# Patient Record
Sex: FEMALE | Race: BLACK OR AFRICAN AMERICAN
[De-identification: names, ages, dates, MRNs, and addresses within clinical notes are randomized per-mention and may not be internally consistent; named-entity substitution may affect disease eponyms.]

---

## 2017-05-18 ENCOUNTER — HOSPITAL ENCOUNTER (EMERGENCY)
Dept: HOSPITAL 17 - NEPK | Age: 32
Discharge: HOME | End: 2017-05-18
Payer: MEDICAID

## 2017-05-18 VITALS
DIASTOLIC BLOOD PRESSURE: 82 MMHG | HEART RATE: 98 BPM | RESPIRATION RATE: 20 BRPM | SYSTOLIC BLOOD PRESSURE: 140 MMHG | TEMPERATURE: 98.6 F | OXYGEN SATURATION: 98 %

## 2017-05-18 VITALS — WEIGHT: 112.44 LBS | BODY MASS INDEX: 18.73 KG/M2 | HEIGHT: 65 IN

## 2017-05-18 DIAGNOSIS — Z79.4: ICD-10-CM

## 2017-05-18 DIAGNOSIS — E10.9: Primary | ICD-10-CM

## 2017-05-18 PROCEDURE — 99282 EMERGENCY DEPT VISIT SF MDM: CPT

## 2017-05-18 NOTE — PD
HPI


.


Shaky and jittery feeling intermittently


Chief Complaint:  Diabetic


Time Seen by Provider:  08:53


Travel History


International Travel<30 days:  No


Contact w/Intl Traveler<30days:  No


Traveled to known affect area:  No





History of Present Illness


HPI


31-year-old female with history of type 1 diabetes here with complaints of 

feeling shaky and jittery occasionally.  Patient tells me that yesterday she 

had a shaky and jittery feeling.  Unfortunately she does not have a glucometer 

and she tells me that she was unable to check her blood sugar.  She does take 

insulin daily.  She recently missed a primary care follow-up and says that she 

did not have a ride.  She is in the process of looking for someone a little bit 

closer to home.  Today she denies any fever, chills, shaking, lightheadedness 

or abnormal sensation.





PFSH


Past Medical History


Autoimmune Disease:  No


Blood Disorders:  No


Cardiovascular Problems:  Yes ("RAPID HEART RATE")


Diabetes:  Yes


Diminished Hearing:  No


Endocrine:  No


Genitourinary:  No


Immune Disorder:  No


Musculoskeletal:  No


Neurologic:  No


Reproductive:  No


Respiratory:  No


Pregnant?:  Not Pregnant


LMP:  LAST MONTH


:  5


Para:  4


Miscarriage:  1


:  0


Tubal Ligation:  Yes





Social History


Alcohol Use:  No


Tobacco Use:  No


Substance Use:  No





Allergies-Medications


(Allergen,Severity, Reaction):  


Coded Allergies:  


     No Known Allergies (Verified , 17)


Reported Meds & Prescriptions





Reported Meds & Active Scripts


Active


Novolog (Insulin Aspart) 100 Units/ML Inj 30 Units SQ AM 


Levemir (Insulin Detemir)  Inj 10 Units SC AM 


Contour Next EZ Blood Glucose Meter (Device)  Device 1 Unit XX AS DIRECTED 


Insulin Syringes 1 ml   Box 100 (Syringes Insulin 1 ml   Box 100)  Box 1 Box XX

  


Glucometer Test Strips (Glucomteststrips)  Box 1 Box XX  


Glucometer  Kit 1 Kit XX  


Mycostatin 500,000 Unit/5 Ml Udc (Nystatin) 5 Ml Susp 5 Ml SS QID  12 Days


Reported


Novolog (Insulin Aspart) 100 Units/ML Inj 30 Units SQ HS 








Review of Systems


General / Constitutional:  No: Fever


Eyes:  No: Visual changes


HENT:  No: Headaches


Cardiovascular:  No: Chest Pain or Discomfort


Respiratory:  No: Shortness of Breath


Gastrointestinal:  No: Abdominal Pain


Genitourinary:  No: Dysuria


Musculoskeletal:  No: Pain


Skin:  No Rash


Neurologic:  No: Weakness


Psychiatric:  No: Depression


Endocrine:  No: Polydipsia


Hematologic/Lymphatic:  No: Easy Bruising





Physical Exam


Narrative


GENERAL: AAO x 3, no acute distress, Well-nourished, well-developed patient.


SKIN: Warm and dry. No visible rashes or bruising. 


HEAD: Normocephalic and atraumatic.


EYES: No scleral icterus. No injection or drainage. EOM intact, PERRLA


ENT: No nasal drainage noted. Mucous membranes pink. Airway patent. 


NECK: Supple, trachea midline. No JVD.


CARDIOVASCULAR: Regular rate and rhythm without murmurs, gallops, or rubs. 


RESPIRATORY: Breath sounds equal bilaterally. No accessory muscle use. No 

rhonchi or rales. 


GASTROINTESTINAL: Abdomen soft, non-tender, nondistended. 


EXTREMITIES: No cyanosis or edema. 


BACK: Nontender without obvious deformity. No CVA tenderness.


NEURO: CN II through XII grossly intact


PSYCH: AAO x 3, normal affect.





Data


Data


Last Documented VS





Vital Signs








  Date Time  Temp Pulse Resp B/P Pulse Ox O2 Delivery O2 Flow Rate FiO2


 


17 08:28 98.6 98 20 140/82 98   











MDM


Medical Decision Making


Medical Screen Exam Complete:  Yes


Emergency Medical Condition:  Yes


Medical Record Reviewed:  Yes


Differential Diagnosis


diabetes, anxiety, dehydration


Narrative Course


31-year-old female here with complaints of shaking and jittery feeling 

yesterday.  Today she does not have the same feeling.  I've done a complete 

head to toe examination and do not find any explanation as to why she felt this 

way.  It's more than likely that she may have had a period of hypoglycemia.  

Her blood sugar today is 99.  She does take insulin daily.  I've advised her 

that she will need to follow-up with her primary care provider for insulin 

adjustment and closer med monitoring.





Patient verbalized understanding of instructions, questions were answered, and 

thanked me for their care. I advised them if their condition worsens, please 

return to the nearest emergency room for further care.





Diagnosis





 Primary Impression:  


 Diabetes type I


 Qualified Code:  E10.9 - Type 1 diabetes mellitus without complication


Patient Instructions:  General Instructions





***Additional Instructions:


Please follow-up with your primary care provider.


Disposition:  01 DISCHARGE HOME


Condition:  Stable








Gosia Kim May 18, 2017 08:53

## 2017-08-17 ENCOUNTER — HOSPITAL ENCOUNTER (INPATIENT)
Dept: HOSPITAL 17 - NEPE | Age: 32
LOS: 2 days | Discharge: HOME | DRG: 638 | End: 2017-08-19
Attending: FAMILY MEDICINE | Admitting: FAMILY MEDICINE
Payer: MEDICAID

## 2017-08-17 VITALS
TEMPERATURE: 97.3 F | HEART RATE: 92 BPM | DIASTOLIC BLOOD PRESSURE: 69 MMHG | SYSTOLIC BLOOD PRESSURE: 115 MMHG | OXYGEN SATURATION: 100 % | RESPIRATION RATE: 18 BRPM

## 2017-08-17 VITALS
OXYGEN SATURATION: 100 % | HEART RATE: 78 BPM | SYSTOLIC BLOOD PRESSURE: 116 MMHG | RESPIRATION RATE: 16 BRPM | DIASTOLIC BLOOD PRESSURE: 79 MMHG

## 2017-08-17 VITALS
TEMPERATURE: 97.7 F | RESPIRATION RATE: 16 BRPM | DIASTOLIC BLOOD PRESSURE: 74 MMHG | SYSTOLIC BLOOD PRESSURE: 111 MMHG | OXYGEN SATURATION: 97 % | HEART RATE: 78 BPM

## 2017-08-17 VITALS
OXYGEN SATURATION: 100 % | DIASTOLIC BLOOD PRESSURE: 70 MMHG | RESPIRATION RATE: 18 BRPM | HEART RATE: 76 BPM | SYSTOLIC BLOOD PRESSURE: 105 MMHG

## 2017-08-17 VITALS
RESPIRATION RATE: 17 BRPM | DIASTOLIC BLOOD PRESSURE: 79 MMHG | SYSTOLIC BLOOD PRESSURE: 184 MMHG | TEMPERATURE: 97.5 F | HEART RATE: 105 BPM | OXYGEN SATURATION: 98 %

## 2017-08-17 VITALS
OXYGEN SATURATION: 100 % | SYSTOLIC BLOOD PRESSURE: 139 MMHG | HEART RATE: 86 BPM | DIASTOLIC BLOOD PRESSURE: 81 MMHG | RESPIRATION RATE: 16 BRPM

## 2017-08-17 VITALS
HEART RATE: 68 BPM | OXYGEN SATURATION: 100 % | RESPIRATION RATE: 16 BRPM | DIASTOLIC BLOOD PRESSURE: 69 MMHG | SYSTOLIC BLOOD PRESSURE: 108 MMHG | TEMPERATURE: 98.2 F

## 2017-08-17 VITALS
SYSTOLIC BLOOD PRESSURE: 123 MMHG | RESPIRATION RATE: 18 BRPM | OXYGEN SATURATION: 100 % | DIASTOLIC BLOOD PRESSURE: 76 MMHG | HEART RATE: 82 BPM

## 2017-08-17 VITALS
RESPIRATION RATE: 18 BRPM | DIASTOLIC BLOOD PRESSURE: 77 MMHG | OXYGEN SATURATION: 100 % | HEART RATE: 84 BPM | SYSTOLIC BLOOD PRESSURE: 123 MMHG

## 2017-08-17 VITALS — WEIGHT: 93.26 LBS | HEIGHT: 65 IN | BODY MASS INDEX: 15.54 KG/M2

## 2017-08-17 VITALS — HEART RATE: 84 BPM

## 2017-08-17 VITALS — HEART RATE: 81 BPM

## 2017-08-17 DIAGNOSIS — E86.0: ICD-10-CM

## 2017-08-17 DIAGNOSIS — E10.649: ICD-10-CM

## 2017-08-17 DIAGNOSIS — Z79.4: ICD-10-CM

## 2017-08-17 DIAGNOSIS — Z91.14: ICD-10-CM

## 2017-08-17 DIAGNOSIS — E87.1: ICD-10-CM

## 2017-08-17 DIAGNOSIS — N28.9: ICD-10-CM

## 2017-08-17 DIAGNOSIS — E10.10: Primary | ICD-10-CM

## 2017-08-17 LAB
ALP SERPL-CCNC: 207 U/L (ref 45–117)
ALT SERPL-CCNC: 23 U/L (ref 10–53)
ANION GAP SERPL CALC-SCNC: 10 MEQ/L (ref 5–15)
ANION GAP SERPL CALC-SCNC: 15 MEQ/L (ref 5–15)
AST SERPL-CCNC: 16 U/L (ref 15–37)
B-OH-BUTYR SERPL-SCNC: 5.35 MMOL/L (ref 0–0.39)
BASOPHILS # BLD AUTO: 0.1 TH/MM3 (ref 0–0.2)
BASOPHILS NFR BLD: 0.8 % (ref 0–2)
BILIRUB SERPL-MCNC: 0.4 MG/DL (ref 0.2–1)
BUN SERPL-MCNC: 11 MG/DL (ref 7–18)
BUN SERPL-MCNC: 9 MG/DL (ref 7–18)
CHLORIDE SERPL-SCNC: 88 MEQ/L (ref 98–107)
CHLORIDE SERPL-SCNC: 99 MEQ/L (ref 98–107)
COLOR UR: (no result)
COMMENT (UR): (no result)
CULTURE IF INDICATED: (no result)
EOSINOPHIL # BLD: 0 TH/MM3 (ref 0–0.4)
EOSINOPHIL NFR BLD: 0.3 % (ref 0–4)
ERYTHROCYTE [DISTWIDTH] IN BLOOD BY AUTOMATED COUNT: 13.5 % (ref 11.6–17.2)
GFR SERPLBLD BASED ON 1.73 SQ M-ARVRAT: 55 ML/MIN (ref 89–?)
GFR SERPLBLD BASED ON 1.73 SQ M-ARVRAT: 74 ML/MIN (ref 89–?)
GLUCOSE UR STRIP-MCNC: 1000 MG/DL
HCO3 BLD-SCNC: 17.6 MEQ/L (ref 21–32)
HCO3 BLD-SCNC: 20.5 MEQ/L (ref 21–32)
HCT VFR BLD CALC: 47 % (ref 35–46)
HEMO FLAGS: (no result)
HGB UR QL STRIP: (no result)
KETONES UR STRIP-MCNC: 150 MG/DL
LYMPHOCYTES # BLD AUTO: 1.6 TH/MM3 (ref 1–4.8)
LYMPHOCYTES NFR BLD AUTO: 18.4 % (ref 9–44)
MAGNESIUM SERPL-MCNC: 1.9 MG/DL (ref 1.5–2.5)
MCH RBC QN AUTO: 32.4 PG (ref 27–34)
MCHC RBC AUTO-ENTMCNC: 32.4 % (ref 32–36)
MCV RBC AUTO: 100 FL (ref 80–100)
MONOCYTES NFR BLD: 6.2 % (ref 0–8)
NEUTROPHILS # BLD AUTO: 6.5 TH/MM3 (ref 1.8–7.7)
NEUTROPHILS NFR BLD AUTO: 74.3 % (ref 16–70)
NITRITE UR QL STRIP: (no result)
PLATELET # BLD: 370 TH/MM3 (ref 150–450)
POTASSIUM SERPL-SCNC: 3.7 MEQ/L (ref 3.5–5.1)
POTASSIUM SERPL-SCNC: 4.4 MEQ/L (ref 3.5–5.1)
RBC # BLD AUTO: 4.7 MIL/MM3 (ref 4–5.3)
SODIUM SERPL-SCNC: 121 MEQ/L (ref 136–145)
SODIUM SERPL-SCNC: 129 MEQ/L (ref 136–145)
SP GR UR STRIP: 1.03 (ref 1–1.03)
SQUAMOUS #/AREA URNS HPF: 1 /HPF (ref 0–5)
WBC # BLD AUTO: 8.7 TH/MM3 (ref 4–11)

## 2017-08-17 PROCEDURE — 81001 URINALYSIS AUTO W/SCOPE: CPT

## 2017-08-17 PROCEDURE — 96375 TX/PRO/DX INJ NEW DRUG ADDON: CPT

## 2017-08-17 PROCEDURE — 96365 THER/PROPH/DIAG IV INF INIT: CPT

## 2017-08-17 PROCEDURE — 82010 KETONE BODYS QUAN: CPT

## 2017-08-17 PROCEDURE — 85025 COMPLETE CBC W/AUTO DIFF WBC: CPT

## 2017-08-17 PROCEDURE — 84100 ASSAY OF PHOSPHORUS: CPT

## 2017-08-17 PROCEDURE — 83735 ASSAY OF MAGNESIUM: CPT

## 2017-08-17 PROCEDURE — 80053 COMPREHEN METABOLIC PANEL: CPT

## 2017-08-17 PROCEDURE — 96361 HYDRATE IV INFUSION ADD-ON: CPT

## 2017-08-17 PROCEDURE — 80048 BASIC METABOLIC PNL TOTAL CA: CPT

## 2017-08-17 PROCEDURE — 83036 HEMOGLOBIN GLYCOSYLATED A1C: CPT

## 2017-08-17 PROCEDURE — 83690 ASSAY OF LIPASE: CPT

## 2017-08-17 PROCEDURE — 82948 REAGENT STRIP/BLOOD GLUCOSE: CPT

## 2017-08-17 RX ADMIN — PHENYTOIN SODIUM SCH MLS/HR: 50 INJECTION INTRAMUSCULAR; INTRAVENOUS at 16:42

## 2017-08-17 RX ADMIN — Medication SCH ML: at 20:48

## 2017-08-17 RX ADMIN — ACYCLOVIR SCH UNITS: 800 TABLET ORAL at 20:48

## 2017-08-17 RX ADMIN — STANDARDIZED SENNA CONCENTRATE AND DOCUSATE SODIUM SCH TAB: 8.6; 5 TABLET, FILM COATED ORAL at 20:48

## 2017-08-17 RX ADMIN — ACYCLOVIR SCH UNITS: 800 TABLET ORAL at 16:46

## 2017-08-17 RX ADMIN — PHENYTOIN SODIUM SCH MLS/HR: 50 INJECTION INTRAMUSCULAR; INTRAVENOUS at 19:11

## 2017-08-17 RX ADMIN — INSULIN ASPART SCH: 100 INJECTION, SOLUTION INTRAVENOUS; SUBCUTANEOUS at 16:00

## 2017-08-17 RX ADMIN — HUMAN INSULIN SCH UNITS: 100 INJECTION, SOLUTION SUBCUTANEOUS at 16:42

## 2017-08-17 RX ADMIN — INSULIN ASPART SCH: 100 INJECTION, SOLUTION INTRAVENOUS; SUBCUTANEOUS at 20:51

## 2017-08-17 NOTE — PD
HPI


Chief Complaint:  Diabetic


Time Seen by Provider:  09:37


Travel History


International Travel<30 days:  No


Contact w/Intl Traveler<30days:  No


Traveled to known affect area:  No





History of Present Illness


HPI


31-year-old female complains of mild headache, shortness of breath, abdominal 

pain, nausea vomiting, dizziness.  Patient states the symptoms started several 

days ago.  Patient has history of diabetes and was on Levemir and Novolin R.  

Patient states that she ran out of Levemir several months ago and Novolin R a 

week ago.  Patient denies any coughing congestion fever chills.  Patient denies 

any dysuria or frequency.  Patient denies any vaginal discharge or bleeding.





PFSH


Past Medical History


Autoimmune Disease:  No


Blood Disorders:  No


Cardiovascular Problems:  Yes ("RAPID HEART RATE")


Diabetes:  Yes


Patient Takes Glucophage:  No


Diminished Hearing:  No


Endocrine:  No


Genitourinary:  No


Immune Disorder:  No


Musculoskeletal:  No


Neurologic:  No


Reproductive:  No


Respiratory:  No


Immunizations Current:  No


Tetanus Vaccination:  < 5 Years


Influenza Vaccination:  No


Pregnant?:  Not Pregnant


LMP:  last month


:  5


Para:  4


Miscarriage:  1


:  0


Tubal Ligation:  Yes





Past Surgical History


Other Surgery:  No





Social History


Alcohol Use:  No


Tobacco Use:  No


Substance Use:  No





Allergies-Medications


(Allergen,Severity, Reaction):  


Coded Allergies:  


     No Known Allergies (Verified , 17)


Reported Meds & Prescriptions





Reported Meds & Active Scripts


Active


Reported


Levemir Inj (Insulin Detemir) 1,000 unit/ 10 ML Vial 10 Units SQ HS


     Do not mix with any other Insulin.


Novolin R Inj (Insulin Human Regular) 1,000 Unit/10 Ml Vial 10 Units SQ TIDAC








Review of Systems


General / Constitutional:  No: Fever


Eyes:  No: Visual changes


HENT:  Positive: Headaches


Cardiovascular:  No: Chest Pain or Discomfort


Respiratory:  No: Shortness of Breath


Gastrointestinal:  Positive: Nausea, Vomiting, Abdominal Pain


Genitourinary:  No: Dysuria


Musculoskeletal:  No: Pain


Skin:  No Rash


Neurologic:  No: Weakness


Psychiatric:  No: Depression


Endocrine:  No: Polydipsia


Hematologic/Lymphatic:  No: Easy Bruising





Physical Exam


Narrative


GENERAL: Well-nourished, well-developed patient.


SKIN: Focused skin assessment warm/dry.


HEAD: Normocephalic.


EYES: No scleral icterus. No injection or drainage. 


NECK: Supple, trachea midline. No JVD or lymphadenopathy.


CARDIOVASCULAR: Regular rate and rhythm without murmurs, gallops, or rubs. 


RESPIRATORY: Breath sounds equal bilaterally. No accessory muscle use.


GASTROINTESTINAL: Abdomen soft, nondistended.  Mild tenderness on palpation 

epigastric area.  No rebound tenderness.  No mass.


MUSCULOSKELETAL: No cyanosis, or edema. 


BACK: Nontender without obvious deformity. No CVA tenderness. 


Neurologic exam normal.





Data


Data


Last Documented VS





Vital Signs








  Date Time  Temp Pulse Resp B/P Pulse Ox O2 Delivery O2 Flow Rate FiO2


 


17 10:30  78 16 116/79 100 Room Air  


 


17 09:28 98.2       








Orders








 Complete Blood Count With Diff (17 09:41)


Comprehensive Metabolic Panel (17 09:41)


Urinalysis - C+S If Indicated (17 09:41)


Beta Hydroxybutyrate (Acetone) (17 09:41)


Iv Access Insert/Monitor (17 09:41)


Ecg Monitoring (17 09:41)


Oximetry (17 09:41)


Sodium Chlor 0.9% 1000 Ml Inj (Ns 1000 M (17 09:45)


Ondansetron Inj (Zofran Inj) (17 09:45)


Lipase (17 09:41)








Labs








 Laboratory Tests








Test 17





 09:25 09:30


 


Urine Color LIGHT-YELLOW  


 


Urine Turbidity CLEAR  


 


Urine pH 5.0  


 


Urine Specific Gravity 1.032  


 


Urine Protein NEG mg/dL 


 


Urine Glucose (UA) 1000 mg/dL 


 


Urine Ketones 150 mg/dL 


 


Urine Occult Blood NEG  


 


Urine Nitrite NEG  


 


Urine Bilirubin NEG  


 


Urine Urobilinogen LESS THAN 2.0 





 MG/DL 


 


Urine Leukocyte Esterase MOD  


 


Urine RBC 2 /hpf 


 


Urine WBC 4 /hpf 


 


Urine Squamous Epithelial 1 /hpf 





Cells  


 


Microscopic Urinalysis Comment CULT NOT 





 INDICATED 


 


White Blood Count  8.7 TH/MM3


 


Red Blood Count  4.70 MIL/MM3


 


Hemoglobin  15.2 GM/DL


 


Hematocrit  47.0 %


 


Mean Corpuscular Volume  100.0 FL


 


Mean Corpuscular Hemoglobin  32.4 PG


 


Mean Corpuscular Hemoglobin  32.4 %





Concent  


 


Red Cell Distribution Width  13.5 %


 


Platelet Count  370 TH/MM3


 


Mean Platelet Volume  8.8 FL


 


Neutrophils (%) (Auto)  74.3 %


 


Lymphocytes (%) (Auto)  18.4 %


 


Monocytes (%) (Auto)  6.2 %


 


Eosinophils (%) (Auto)  0.3 %


 


Basophils (%) (Auto)  0.8 %


 


Neutrophils # (Auto)  6.5 TH/MM3


 


Lymphocytes # (Auto)  1.6 TH/MM3


 


Monocytes # (Auto)  0.5 TH/MM3


 


Eosinophils # (Auto)  0.0 TH/MM3


 


Basophils # (Auto)  0.1 TH/MM3


 


CBC Comment  DIFF FINAL 


 


Differential Comment   


 


Sodium Level  121 MEQ/L


 


Potassium Level  3.7 MEQ/L


 


Chloride Level  88 MEQ/L


 


Carbon Dioxide Level  17.6 MEQ/L


 


Anion Gap  15 MEQ/L


 


Blood Urea Nitrogen  9 MG/DL


 


Creatinine  1.34 MG/DL


 


Estimat Glomerular Filtration  55 ML/MIN





Rate  


 


Random Glucose  696 MG/DL


 


Calcium Level  10.1 MG/DL


 


Total Bilirubin  0.4 MG/DL


 


Aspartate Amino Transf  16 U/L





(AST/SGOT)  


 


Alanine Aminotransferase  23 U/L





(ALT/SGPT)  


 


Alkaline Phosphatase  207 U/L


 


Total Protein  9.8 GM/DL


 


Albumin  4.5 GM/DL


 


Lipase  93 U/L


 


B-Hydroxybutyrate  5.35 MMOL/L














MDM


Medical Decision Making


Medical Screen Exam Complete:  Yes


Emergency Medical Condition:  Yes


Interpretation(s)


11:49 AM.  CBC within normal limit.  Sodium 121.  Bicarbonate 17.6.  Creatinine 

1.34.  Glucose 696.  Beta hydroxy butyrate 5.35.  UA is positive for glucose 

and ketone.


Differential Diagnosis


Differential diagnosis including hyperglycemia, DKA, electrolyte imbalance, 

dehydration, gastritis, PUD, pancreatitis, cholecystitis, colitis, UTI, 

pyelonephritis.


Narrative Course


31-year-old female with multiple complaints including headache, shortness of 

breath, abdominal pain, nausea vomiting.  Patient has history diabetes and out 

of her insulin recently.  Normal saline solution 1 L IV bolus.  Zofran 4 mg IV.

  DKA protocol started.





Diagnosis





 Primary Impression:  


 DKA (diabetic ketoacidoses)


 Qualified Code:  E10.10 - Diabetic ketoacidosis without coma associated with 

type 1 diabetes mellitus





Admitting Information


Admitting Physician Requests:  Admit








Roddy Godfrey MD Aug 17, 2017 09:47

## 2017-08-17 NOTE — HHI.HP
__________________________________________________





[facility]


Service


[facility]


Primary Care Physician


No Primary Care Physician


Admission Diagnosis


diabetic ketoacidosis


Diagnoses:  


Chief Complaint:  


not feeling well


Travel History


International Travel<30 Days:  No


Contact w/Intl Traveler <30 Da:  No


Traveled to Known Affected Are:  No


History of Present Illness


This is a 31-year-old female with history of type 1 diabetes who presented with 

"not feeling well."  Patient stated that she came to ED because she did not 

feel well.  She stated that she had mild abdominal pain earlier but that 

resolved.  She also had episode of emesis described as clear that resolved.  

Patient also said that she had a headache.  She stated that she knew it was her 

diabetes as she came into the hospital.  Patient does have good urine output.  

She stated that she changed insurance over a month ago and she cannot find a 

provider that will take [employer].  She stated that she wants to keep her 

current provider but he does not take that type of insurance.  She stated that 

for the past months she has not been taking her insulin.  Prior she was on 

Novolin 70/30 40-50 at night and NovoLog 10 units before meals.  She also 

stated that she feels dry/dehydrated.





All other review symptoms reviewed and are negative.





Past Family Social History


Past Medical History


Type 1 diabetes


Noncompliance


Past Surgical History


Tubal ligation


Reported Medications





Currently patient is not taking any medication.


Allergies:  


Coded Allergies:  


     No Known Allergies (Verified , 5/18/17)


Active Ordered Medications





 Current Medications


Sodium Chloride (NS 1000 ml Inj) 1,000 ml @  999 mls/hr BOLUS  ONCE IV  Last 

administered on 8/17/17at 10:33;  Start 8/17/17 at 09:45;  Stop 8/17/17 at 10:45

;  Status DC


Ondansetron HCl 4 mg 4 mg ONCE  ONCE IV PUSH  Last administered on 8/17/17at 10:

34;  Start 8/17/17 at 09:45;  Stop 8/17/17 at 09:46;  Status DC


Sodium Chloride 1,000 ml @  250 mls/hr Q4H IV  Last administered on 8/17/17at 11

:58;  Start 8/17/17 at 11:51;  Stop 8/17/17 at 13:57;  Status DC


Dextrose/Sodium Chloride (D5W-NS 1000 ml Inj) 1,000 ml @  200 mls/hr Q5H IV  

Last administered on 8/17/17at 13:48;  Start 8/17/17 at 11:51;  Stop 8/17/17 at 

13:52;  Status DC


Insulin Human Regular 5 units 5 units BOLUS  ONCE IV PUSH  Last administered on 

8/17/17at 12:32;  Start 8/17/17 at 12:00;  Stop 8/17/17 at 12:01;  Status DC


Insulin Human Regular 100 units/ Sodium Chloride 100 ml @ 0 mls/hr TITRATE IV  

Last administered on 8/17/17at 13:40;  Start 8/17/17 at 12:00;  Stop 8/17/17 at 

13:54;  Status DC


Potassium Chloride 100 ml @  100 mls/hr Q1H  PRN IV SEE LABEL COMMENTS;  Start 8 /17/17 at 12:00


Potassium Chloride 100 ml @  50 mls/hr Q2H  PRN IV SEE LABEL COMMENTS;  Start 8/ 17/17 at 12:00


Potassium Chloride 100 ml @  100 mls/hr Q1H  PRN IV SEE LABEL COMMENTS;  Start 8 /17/17 at 12:00


Potassium Chloride 100 ml @  100 mls/hr Q1H  PRN IV SEE LABEL COMMENTS;  Start 8 /17/17 at 12:00


Potassium Chloride 100 ml @  50 mls/hr Q2H  PRN IV SEE LABEL COMMENTS;  Start 8/ 17/17 at 12:00


Potassium Chloride 100 ml @  50 mls/hr Q2H  PRN IV SEE LABEL COMMENTS;  Start 8/ 17/17 at 12:00


Potassium Chloride 100 ml @  50 mls/hr Q2H  PRN IV SEE LABEL COMMENTS Last 

administered on 8/17/17at 12:17;  Start 8/17/17 at 12:00


Potassium Chloride (KCl 20 Meq Premix Inj) 100 ml @  50 mls/hr Q2H  PRN IV SEE 

LABEL COMMENTS;  Start 8/17/17 at 12:00


Sodium Bicarbonate (Sodium Bicarbonate 8.4% Inj) 100 meq UNSCH  PRN IV SEE 

LABEL COMMENTS;  Start 8/17/17 at 12:00


Sodium Bicarbonate 50 meq 50 meq UNSCH  PRN IV SEE LABEL COMMENTS;  Start 8/17/ 17 at 12:00


Sodium Phosphate 15 mmol/Sodium Chloride 105 ml @  25 mls/hr UNSCH  PRN IV SEE 

LABEL COMMENTS;  Start 8/17/17 at 12:00


Sodium Chloride (NS 1000 ml Inj) 1,000 ml @  150 mls/hr Q6H40M IV ;  Start 8/17/ 17 at 12:31


Sodium Chloride (NS Flush) 2 ml UNSCH  PRN IV FLUSH FLUSH AFTER USING IV ACCESS

;  Start 8/17/17 at 12:45


Sodium Chloride (NS Flush) 2 ml BID IV FLUSH ;  Start 8/17/17 at 21:00


Acetaminophen (Tylenol) 650 mg Q4H  PRN PO TEMP > 100.4;  Start 8/17/17 at 12:45


Ondansetron HCl (Zofran Inj) 4 mg Q6H  PRN IVP NAUSEA OR VOMITING;  Start 8/17/ 17 at 12:45


Naloxone HCl (Narcan Inj) 0.4 mg UNSCH  PRN IV SEE LABEL COMMENTS;  Start 8/17/ 17 at 12:45


Senna/Docusate Sodium (Cass-Colace) 1 tab BID PO ;  Start 8/17/17 at 21:00


Magnesium Hydroxide (Milk Of Magnesia Liq) 30 ml Q12H  PRN PO MILD - MODERATE 

CONSTIPATION;  Start 8/17/17 at 12:45


Sennosides (Senokot) 17.2 mg Q12H  PRN PO MODERATE - SEVERE CONSTIPATION;  

Start 8/17/17 at 12:45


Bisacodyl (Dulcolax Supp) 10 mg DAILY  PRN RECTAL SEVERE CONSITIPATION;  Start 8 /17/17 at 12:45


Lactulose (Lactulose Liq) 30 ml DAILY  PRN PO SEVERE CONSITIPATION;  Start 8/17/ 17 at 12:45


Insulin Human Regular (NovoLIN R INJ) 10 units TIDAC SQ ;  Start 8/17/17 at 17:

00


Insulin Detemir (Levemir Inj) 15 units BID SQ ;  Start 8/17/17 at 14:00


Dextrose (D50w (Vial) Inj) 50 ml UNSCH  PRN IV HYPOGLYCEMIA-SEE COMMENTS;  

Start 8/17/17 at 12:45


Glucagon (Glucagon Inj) 1 mg UNSCH  PRN OTHER HYPOGLYCEMIA-SEE COMMENTS;  Start 

8/17/17 at 12:45


Insulin Aspart (NovoLOG SUPPLEMENTAL SCALE) 1 ACHS SLIDING  SCALE SQ ;  Start 8/ 17/17 at 16:00


Family History


Father has history of diabetes.


Social History


Denies any alcohol, tobacco, or illicit drug use.





Physical Exam


Vital Signs





 Vital Signs








  Date Time  Temp Pulse Resp B/P Pulse Ox O2 Delivery O2 Flow Rate FiO2


 


8/17/17 14:30  84 18 123/77 100 Room Air  


 


8/17/17 13:30  82 18 123/76 100 Room Air  


 


8/17/17 12:30  86 16 139/81 100 Room Air  


 


8/17/17 11:30  76 18 105/70 100 Room Air  


 


8/17/17 10:30  78 16 116/79 100 Room Air  


 


8/17/17 09:30      Room Air  


 


8/17/17 09:28 98.2 68 16 108/69 100   


 


8/17/17 08:52 97.5 105 17 184/79 98   








Physical Exam


GENERAL: This very thin female in no acute distress.


SKIN: No rashes, ecchymoses or lesions. Cool and dry.


HEAD: Atraumatic. Normocephalic. No temporal or scalp tenderness.


EYES: Pupils equal round and reactive. Extraocular motions intact. No scleral 

icterus. No injection or drainage. 


ENT: Nose without bleeding, purulent drainage or septal hematoma. Throat 

without erythema, tonsillar hypertrophy or exudate. Uvula midline. Airway 

patent.  Lips are dry.  Mucous membranes also dry.


NECK: Trachea midline. No JVD or lymphadenopathy. Supple, nontender, no 

meningeal signs.


CARDIOVASCULAR: Regular rate and rhythm without murmurs, gallops, or rubs. 


RESPIRATORY: Clear to auscultation. Breath sounds equal bilaterally. No wheezes

, rales, or rhonchi.  


GASTROINTESTINAL: Abdomen soft, non-tender, nondistended. No hepato-splenomegaly

, or palpable masses. No guarding.


MUSCULOSKELETAL: Extremities without clubbing, cyanosis, or edema. No joint 

tenderness, effusion, or edema noted. No calf tenderness. Negative Homans sign 

bilaterally.


NEUROLOGICAL: Awake and alert. Cranial nerves II through XII intact.  Motor and 

sensory grossly within normal limits. Five out of 5 muscle strength in all 

muscle groups.  Normal speech.


Laboratory





Laboratory Tests








Test 8/17/17 8/17/17





 09:25 09:30


 


Urine Color LIGHT-YELLOW  


 


Urine Turbidity CLEAR  


 


Urine pH 5.0  


 


Urine Specific Gravity 1.032  


 


Urine Protein NEG  


 


Urine Glucose (UA) 1000  


 


Urine Ketones 150  


 


Urine Occult Blood NEG  


 


Urine Nitrite NEG  


 


Urine Bilirubin NEG  


 


Urine Urobilinogen LESS THAN 2.0  


 


Urine Leukocyte Esterase MOD  


 


Urine RBC 2  


 


Urine WBC 4  


 


Urine Squamous Epithelial 1  





Cells  


 


Microscopic Urinalysis Comment CULT NOT 





 INDICATED 


 


White Blood Count  8.7 


 


Red Blood Count  4.70 


 


Hemoglobin  15.2 


 


Hematocrit  47.0 


 


Mean Corpuscular Volume  100.0 


 


Mean Corpuscular Hemoglobin  32.4 


 


Mean Corpuscular Hemoglobin  32.4 





Concent  


 


Red Cell Distribution Width  13.5 


 


Platelet Count  370 


 


Mean Platelet Volume  8.8 


 


Neutrophils (%) (Auto)  74.3 


 


Lymphocytes (%) (Auto)  18.4 


 


Monocytes (%) (Auto)  6.2 


 


Eosinophils (%) (Auto)  0.3 


 


Basophils (%) (Auto)  0.8 


 


Neutrophils # (Auto)  6.5 


 


Lymphocytes # (Auto)  1.6 


 


Monocytes # (Auto)  0.5 


 


Eosinophils # (Auto)  0.0 


 


Basophils # (Auto)  0.1 


 


CBC Comment  DIFF FINAL 


 


Differential Comment   


 


Sodium Level  121 


 


Potassium Level  3.7 


 


Chloride Level  88 


 


Carbon Dioxide Level  17.6 


 


Anion Gap  15 


 


Blood Urea Nitrogen  9 


 


Creatinine  1.34 


 


Estimat Glomerular Filtration  55 





Rate  


 


Random Glucose  696 


 


Calcium Level  10.1 


 


Total Bilirubin  0.4 


 


Aspartate Amino Transf  16 





(AST/SGOT)  


 


Alanine Aminotransferase  23 





(ALT/SGPT)  


 


Alkaline Phosphatase  207 


 


Total Protein  9.8 


 


Albumin  4.5 


 


Lipase  93 


 


B-Hydroxybutyrate  5.35 








Result Diagram:  


8/17/17 0930                                                                   

             8/17/17 0930








Assessment and Plan


Assessment and Plan


31-year-old female with type 1 diabetes and noncompliant who has not been 

taking her insulin for the past month who presented with





Mild DKA/uncontrolled diabetes


-Symptoms are only significant for dehydration otherwise they're pretty mild.  

Labs reviewed no anion gap.  Beta hydroxybutyrate elevated.  Bicarbonate 17.5.


-Since DKA is mild and she is mostly dehydrated with treat with subcutaneous 

insulin.  We'll start with Levemir 15 units subcutaneous twice a day.  Start 

patient on a high dose of insulin sliding scale.  Will monitor labs and adjust 

accordingly.  Blood sugars in the 600s so we'll initially put patient on normal 

saline at 150 cc/hr.  Encourage fluid intake.





Pseudohyponatremia


-Corrected sodium level is 141.


-Continue to monitor.


-Neuro checks.





Renal sufficiency


-Creatinine is elevated from baseline.  Due to his dehydration.


-Strict ins and outs.  Monitor creatinine.  Continue with IV fluids.





DVT prophylaxis


-SCDs.





Will consult case management to help patient establish with another primary 

care physician since patient has no PCP to prescribe her insulin.


Code Status


Full code


Discussed Condition With


Patient and her nurse





Physician Certification


2 Midnight Certification Type:  Admission for Inpatient Services


Order for Inpatient Services


The services are ordered in accordance with Medicare regulations or non-

Medicare payer requirements, as applicable.  In the case of services not 

specified as inpatient-only, they are appropriately provided as inpatient 

services in accordance with the 2-midnight benchmark.


Estimated LOS (days):  2


2 days is the estimated time the patient will need to remain in the hospital, 

assuming treatment plan goals are met and no additional complications.


Post-Hospital Plan:  [place]








Mone Mcmahon MD Aug 17, 2017 15:15

## 2017-08-18 VITALS
HEART RATE: 76 BPM | DIASTOLIC BLOOD PRESSURE: 69 MMHG | SYSTOLIC BLOOD PRESSURE: 107 MMHG | OXYGEN SATURATION: 100 % | TEMPERATURE: 97.8 F | RESPIRATION RATE: 18 BRPM

## 2017-08-18 VITALS
DIASTOLIC BLOOD PRESSURE: 69 MMHG | RESPIRATION RATE: 17 BRPM | SYSTOLIC BLOOD PRESSURE: 112 MMHG | TEMPERATURE: 97.8 F | OXYGEN SATURATION: 100 % | HEART RATE: 74 BPM

## 2017-08-18 VITALS
RESPIRATION RATE: 16 BRPM | DIASTOLIC BLOOD PRESSURE: 62 MMHG | OXYGEN SATURATION: 100 % | SYSTOLIC BLOOD PRESSURE: 101 MMHG | TEMPERATURE: 99 F | HEART RATE: 94 BPM

## 2017-08-18 VITALS
RESPIRATION RATE: 17 BRPM | TEMPERATURE: 97.6 F | SYSTOLIC BLOOD PRESSURE: 111 MMHG | DIASTOLIC BLOOD PRESSURE: 70 MMHG | OXYGEN SATURATION: 100 % | HEART RATE: 92 BPM

## 2017-08-18 VITALS
TEMPERATURE: 97.5 F | OXYGEN SATURATION: 100 % | DIASTOLIC BLOOD PRESSURE: 64 MMHG | HEART RATE: 81 BPM | SYSTOLIC BLOOD PRESSURE: 105 MMHG | RESPIRATION RATE: 17 BRPM

## 2017-08-18 VITALS
RESPIRATION RATE: 16 BRPM | TEMPERATURE: 98.6 F | DIASTOLIC BLOOD PRESSURE: 66 MMHG | HEART RATE: 88 BPM | SYSTOLIC BLOOD PRESSURE: 114 MMHG | OXYGEN SATURATION: 99 %

## 2017-08-18 VITALS — HEART RATE: 92 BPM

## 2017-08-18 VITALS — HEART RATE: 75 BPM

## 2017-08-18 LAB
ANION GAP SERPL CALC-SCNC: 7 MEQ/L (ref 5–15)
ANION GAP SERPL CALC-SCNC: 7 MEQ/L (ref 5–15)
ANION GAP SERPL CALC-SCNC: 9 MEQ/L (ref 5–15)
B-OH-BUTYR SERPL-SCNC: 0.14 MMOL/L (ref 0–0.39)
B-OH-BUTYR SERPL-SCNC: 1.15 MMOL/L (ref 0–0.39)
BUN SERPL-MCNC: 10 MG/DL (ref 7–18)
BUN SERPL-MCNC: 7 MG/DL (ref 7–18)
BUN SERPL-MCNC: 7 MG/DL (ref 7–18)
CHLORIDE SERPL-SCNC: 103 MEQ/L (ref 98–107)
CHLORIDE SERPL-SCNC: 103 MEQ/L (ref 98–107)
CHLORIDE SERPL-SCNC: 109 MEQ/L (ref 98–107)
GFR SERPLBLD BASED ON 1.73 SQ M-ARVRAT: 104 ML/MIN (ref 89–?)
GFR SERPLBLD BASED ON 1.73 SQ M-ARVRAT: 117 ML/MIN (ref 89–?)
GFR SERPLBLD BASED ON 1.73 SQ M-ARVRAT: 162 ML/MIN (ref 89–?)
HCO3 BLD-SCNC: 18.9 MEQ/L (ref 21–32)
HCO3 BLD-SCNC: 23.2 MEQ/L (ref 21–32)
HCO3 BLD-SCNC: 23.3 MEQ/L (ref 21–32)
HEMOGLOBIN A1A: 1.3 %
HEMOGLOBIN A1B: 0.9 %
HEMOGLOBIN AO: 74.5 %
HEMOGLOBIN LA1C: 1.6 %
HEMOGLOBIN P3: 5.2 %
HGB F MFR BLD: 2.8 %
MAGNESIUM SERPL-MCNC: 1.8 MG/DL (ref 1.5–2.5)
MAGNESIUM SERPL-MCNC: 1.9 MG/DL (ref 1.5–2.5)
MAGNESIUM SERPL-MCNC: 1.9 MG/DL (ref 1.5–2.5)
POTASSIUM SERPL-SCNC: 3.6 MEQ/L (ref 3.5–5.1)
POTASSIUM SERPL-SCNC: 3.7 MEQ/L (ref 3.5–5.1)
POTASSIUM SERPL-SCNC: 4 MEQ/L (ref 3.5–5.1)
SODIUM SERPL-SCNC: 133 MEQ/L (ref 136–145)
SODIUM SERPL-SCNC: 133 MEQ/L (ref 136–145)
SODIUM SERPL-SCNC: 137 MEQ/L (ref 136–145)

## 2017-08-18 RX ADMIN — HUMAN INSULIN SCH UNITS: 100 INJECTION, SOLUTION SUBCUTANEOUS at 08:00

## 2017-08-18 RX ADMIN — HUMAN INSULIN SCH UNITS: 100 INJECTION, SOLUTION SUBCUTANEOUS at 12:00

## 2017-08-18 RX ADMIN — STANDARDIZED SENNA CONCENTRATE AND DOCUSATE SODIUM SCH TAB: 8.6; 5 TABLET, FILM COATED ORAL at 09:00

## 2017-08-18 RX ADMIN — PHENYTOIN SODIUM SCH MLS/HR: 50 INJECTION INTRAMUSCULAR; INTRAVENOUS at 15:11

## 2017-08-18 RX ADMIN — STANDARDIZED SENNA CONCENTRATE AND DOCUSATE SODIUM SCH TAB: 8.6; 5 TABLET, FILM COATED ORAL at 21:50

## 2017-08-18 RX ADMIN — Medication SCH ML: at 09:00

## 2017-08-18 RX ADMIN — INSULIN ASPART SCH: 100 INJECTION, SOLUTION INTRAVENOUS; SUBCUTANEOUS at 21:53

## 2017-08-18 RX ADMIN — PHENYTOIN SODIUM SCH MLS/HR: 50 INJECTION INTRAMUSCULAR; INTRAVENOUS at 01:51

## 2017-08-18 RX ADMIN — PHENYTOIN SODIUM SCH MLS/HR: 50 INJECTION INTRAMUSCULAR; INTRAVENOUS at 21:54

## 2017-08-18 RX ADMIN — Medication SCH ML: at 21:51

## 2017-08-18 RX ADMIN — PHENYTOIN SODIUM SCH MLS/HR: 50 INJECTION INTRAMUSCULAR; INTRAVENOUS at 21:51

## 2017-08-18 RX ADMIN — PHENYTOIN SODIUM SCH MLS/HR: 50 INJECTION INTRAMUSCULAR; INTRAVENOUS at 08:31

## 2017-08-18 RX ADMIN — INSULIN ASPART SCH: 100 INJECTION, SOLUTION INTRAVENOUS; SUBCUTANEOUS at 16:00

## 2017-08-18 RX ADMIN — INSULIN ASPART SCH: 100 INJECTION, SOLUTION INTRAVENOUS; SUBCUTANEOUS at 11:00

## 2017-08-18 NOTE — HHI.PR
Subjective


Remarks


Follow-up for mild DKA


Patient denies any nausea, vomiting, or abdominal pain.  She stated that she 

tolerate oral intake.  Patient said that she has been eating very well.


She has no other complaints.  Blood sugars this morning went to lower 50s.





Objective


Vitals





 Vital Signs








  Date Time  Temp Pulse Resp B/P Pulse Ox O2 Delivery O2 Flow Rate FiO2


 


8/18/17 07:51 97.8 76 18 107/69 100   


 


8/18/17 04:30 97.8 74 17 112/69 100   


 


8/18/17 00:40 98.6 88 16 114/66 99   


 


8/17/17 20:35 97.7 78 16 111/74 97   


 


8/17/17 20:30  81      


 


8/17/17 19:07  84      


 


8/17/17 17:51 97.3 92 18 115/69 100   


 


8/17/17 17:40      Room Air  


 


8/17/17 17:30  91 18  100   


 


8/17/17 14:30  84 18 123/77 100 Room Air  


 


8/17/17 13:30  82 18 123/76 100 Room Air  


 


8/17/17 12:30  86 16 139/81 100 Room Air  


 


8/17/17 11:30  76 18 105/70 100 Room Air  








 I/O








 8/17/17 8/17/17 8/17/17 8/18/17 8/18/17 8/18/17





 06:59 14:59 22:59 06:59 14:59 22:59


 


Intake Total   1058 ml 1277 ml  


 


Balance   1058 ml 1277 ml  


 


      


 


Intake Oral   720 ml 240 ml  


 


IV Total   338 ml 1037 ml  


 


# Voids  1 2 2  


 


# Bowel Movements   0 0  








Result Diagram:  


8/17/17 0930                                                                   

             8/18/17 0415





Objective Remarks


GENERAL: Very thin female in no acute distress


CARDIOVASCULAR: Regular rate and rhythm without murmurs, gallops, or rubs. 


RESPIRATORY: Breath sounds equal bilaterally. No accessory muscle use.


GASTROINTESTINAL: Abdomen soft, non-tender, nondistended. 


MUSCULOSKELETAL: No cyanosis, or edema. 


BACK: Nontender without obvious deformity. No CVA tenderness.





Medications and IVs





 Current Medications


Sodium Chloride (NS 1000 ml Inj) 1,000 ml @  999 mls/hr BOLUS  ONCE IV  Last 

administered on 8/17/17at 10:33;  Start 8/17/17 at 09:45;  Stop 8/17/17 at 10:45

;  Status DC


Ondansetron HCl 4 mg 4 mg ONCE  ONCE IV PUSH  Last administered on 8/17/17at 10:

34;  Start 8/17/17 at 09:45;  Stop 8/17/17 at 09:46;  Status DC


Sodium Chloride 1,000 ml @  250 mls/hr Q4H IV  Last administered on 8/17/17at 11

:58;  Start 8/17/17 at 11:51;  Stop 8/17/17 at 13:57;  Status DC


Dextrose/Sodium Chloride (D5W-NS 1000 ml Inj) 1,000 ml @  200 mls/hr Q5H IV  

Last administered on 8/17/17at 13:48;  Start 8/17/17 at 11:51;  Stop 8/17/17 at 

13:52;  Status DC


Insulin Human Regular 5 units 5 units BOLUS  ONCE IV PUSH  Last administered on 

8/17/17at 12:32;  Start 8/17/17 at 12:00;  Stop 8/17/17 at 12:01;  Status DC


Insulin Human Regular 100 units/ Sodium Chloride 100 ml @ 0 mls/hr TITRATE IV  

Last administered on 8/17/17at 13:40;  Start 8/17/17 at 12:00;  Stop 8/17/17 at 

13:54;  Status DC


Potassium Chloride 100 ml @  100 mls/hr Q1H  PRN IV SEE LABEL COMMENTS;  Start 8 /17/17 at 12:00


Potassium Chloride 100 ml @  50 mls/hr Q2H  PRN IV SEE LABEL COMMENTS;  Start 8/ 17/17 at 12:00


Potassium Chloride 100 ml @  100 mls/hr Q1H  PRN IV SEE LABEL COMMENTS;  Start 8 /17/17 at 12:00


Potassium Chloride 100 ml @  100 mls/hr Q1H  PRN IV SEE LABEL COMMENTS;  Start 8 /17/17 at 12:00


Potassium Chloride 100 ml @  50 mls/hr Q2H  PRN IV SEE LABEL COMMENTS;  Start 8/ 17/17 at 12:00


Potassium Chloride 100 ml @  50 mls/hr Q2H  PRN IV SEE LABEL COMMENTS;  Start 8/ 17/17 at 12:00


Potassium Chloride 100 ml @  50 mls/hr Q2H  PRN IV SEE LABEL COMMENTS Last 

administered on 8/17/17at 12:17;  Start 8/17/17 at 12:00


Potassium Chloride (KCl 20 Meq Premix Inj) 100 ml @  50 mls/hr Q2H  PRN IV SEE 

LABEL COMMENTS;  Start 8/17/17 at 12:00


Sodium Bicarbonate (Sodium Bicarbonate 8.4% Inj) 100 meq UNSCH  PRN IV SEE 

LABEL COMMENTS;  Start 8/17/17 at 12:00


Sodium Bicarbonate 50 meq 50 meq UNSCH  PRN IV SEE LABEL COMMENTS;  Start 8/17/ 17 at 12:00


Sodium Phosphate 15 mmol/Sodium Chloride 105 ml @  25 mls/hr UNSCH  PRN IV SEE 

LABEL COMMENTS;  Start 8/17/17 at 12:00


Sodium Chloride (NS 1000 ml Inj) 1,000 ml @  150 mls/hr Q6H40M IV  Last 

administered on 8/17/17at 16:42;  Start 8/17/17 at 12:31


Sodium Chloride (NS Flush) 2 ml UNSCH  PRN IV FLUSH FLUSH AFTER USING IV ACCESS

;  Start 8/17/17 at 12:45


Sodium Chloride (NS Flush) 2 ml BID IV FLUSH ;  Start 8/17/17 at 21:00


Acetaminophen (Tylenol) 650 mg Q4H  PRN PO TEMP > 100.4;  Start 8/17/17 at 12:45


Ondansetron HCl (Zofran Inj) 4 mg Q6H  PRN IVP NAUSEA OR VOMITING;  Start 8/17/ 17 at 12:45


Naloxone HCl (Narcan Inj) 0.4 mg UNSCH  PRN IV SEE LABEL COMMENTS;  Start 8/17/ 17 at 12:45


Senna/Docusate Sodium (Cass-Colace) 1 tab BID PO  Last administered on 8/17/ 17at 20:48;  Start 8/17/17 at 21:00


Magnesium Hydroxide (Milk Of Magnesia Liq) 30 ml Q12H  PRN PO MILD - MODERATE 

CONSTIPATION;  Start 8/17/17 at 12:45


Sennosides (Senokot) 17.2 mg Q12H  PRN PO MODERATE - SEVERE CONSTIPATION;  

Start 8/17/17 at 12:45


Bisacodyl (Dulcolax Supp) 10 mg DAILY  PRN RECTAL SEVERE CONSITIPATION;  Start 8 /17/17 at 12:45


Lactulose (Lactulose Liq) 30 ml DAILY  PRN PO SEVERE CONSITIPATION;  Start 8/17/ 17 at 12:45


Insulin Human Regular (NovoLIN R INJ) 10 units TIDAC SQ ;  Start 8/17/17 at 17:

00


Insulin Detemir (Levemir Inj) 15 units BID SQ  Last administered on 8/17/17at 20

:48;  Start 8/17/17 at 14:00;  Stop 8/18/17 at 10:14;  Status DC


Dextrose (D50w (Vial) Inj) 50 ml UNSCH  PRN IV HYPOGLYCEMIA-SEE COMMENTS;  

Start 8/17/17 at 12:45


Glucagon (Glucagon Inj) 1 mg UNSCH  PRN OTHER HYPOGLYCEMIA-SEE COMMENTS;  Start 

8/17/17 at 12:45


Insulin Aspart (NovoLOG SUPPLEMENTAL SCALE) 1 ACHS SLIDING  SCALE SQ  Last 

administered on 8/17/17at 20:51;  Start 8/17/17 at 16:00;  Stop 8/18/17 at 10:14

;  Status DC


Insulin Human Regular (NovoLIN R INJ) 5 units ONCE  ONCE IV PUSH  Last 

administered on 8/17/17at 23:58;  Start 8/17/17 at 23:45;  Stop 8/17/17 at 23:47

;  Status DC


Insulin Aspart (NovoLOG SUPPLEMENTAL SCALE) 1 ACHS SLIDING  SCALE SQ ;  Start 8/ 18/17 at 11:00;  Status UNV





A/P


Assessment and Plan


31-year-old female with type 1 diabetes and noncompliant who has not been 

taking her insulin for the past month who presented with





Mild DKA/uncontrolled diabetes


-Symptoms are only significant for dehydration otherwise they're pretty mild.  

Labs reviewed no anion gap.  Beta hydroxybutyrate elevated.  Bicarbonate 17.5.


-Follow-up labs reviewed in improving.  Patient did have an episode of 

hypoglycemia.  Will DC Levemir and decrease insulin sliding scale to medium 

dose.  We'll monitor blood sugars and try to transition patient to Novolin 70/

30.





Pseudohyponatremia


-Corrected sodium level is 141.


-Continue to monitor.


-Neuro checks.





Renal sufficiency


-Resolved.  Patient back to her baseline.





DVT prophylaxis


-SCDs.


Discharge Planning


Will need to monitor another day due to hypoglycemia and make sure blood sugars 

are stabilized.








Mone Mcmahon MD Aug 18, 2017 11:06

## 2017-08-19 VITALS
DIASTOLIC BLOOD PRESSURE: 63 MMHG | TEMPERATURE: 97.6 F | SYSTOLIC BLOOD PRESSURE: 94 MMHG | HEART RATE: 79 BPM | RESPIRATION RATE: 19 BRPM | OXYGEN SATURATION: 100 %

## 2017-08-19 VITALS
HEART RATE: 89 BPM | SYSTOLIC BLOOD PRESSURE: 101 MMHG | DIASTOLIC BLOOD PRESSURE: 67 MMHG | OXYGEN SATURATION: 99 % | RESPIRATION RATE: 16 BRPM | TEMPERATURE: 98.8 F

## 2017-08-19 VITALS
HEART RATE: 85 BPM | RESPIRATION RATE: 17 BRPM | TEMPERATURE: 98.7 F | DIASTOLIC BLOOD PRESSURE: 64 MMHG | OXYGEN SATURATION: 99 % | SYSTOLIC BLOOD PRESSURE: 104 MMHG

## 2017-08-19 VITALS
SYSTOLIC BLOOD PRESSURE: 100 MMHG | OXYGEN SATURATION: 100 % | TEMPERATURE: 97.6 F | RESPIRATION RATE: 18 BRPM | HEART RATE: 84 BPM | DIASTOLIC BLOOD PRESSURE: 58 MMHG

## 2017-08-19 RX ADMIN — STANDARDIZED SENNA CONCENTRATE AND DOCUSATE SODIUM SCH TAB: 8.6; 5 TABLET, FILM COATED ORAL at 09:44

## 2017-08-19 RX ADMIN — PHENYTOIN SODIUM SCH MLS/HR: 50 INJECTION INTRAMUSCULAR; INTRAVENOUS at 10:48

## 2017-08-19 RX ADMIN — INSULIN ASPART SCH: 100 INJECTION, SOLUTION INTRAVENOUS; SUBCUTANEOUS at 06:55

## 2017-08-19 RX ADMIN — Medication SCH ML: at 09:44

## 2017-08-19 RX ADMIN — INSULIN ASPART SCH: 100 INJECTION, SOLUTION INTRAVENOUS; SUBCUTANEOUS at 10:48

## 2017-08-19 NOTE — HHI.DS
__________________________________________________





Discharge Summary


Admission Date


Aug 17, 2017 at 12:40


Discharge Date:  Aug 19, 2017


Admitting Diagnosis


diabetic ketoacidosis





(1) DKA (diabetic ketoacidoses)


ICD Code:  E13.10


Diagnosis:  Principal





(2) Diabetes type I


ICD Code:  E10.9


Diagnosis:  Secondary





(3) Renal insufficiency


ICD Code:  N28.9


Diagnosis:  Principal





(4) Hyponatremia


ICD Code:  E87.1


Diagnosis:  Principal





(5) Noncompliance


ICD Code:  Z91.19


Diagnosis:  Principal





Procedures


See hospital course.


Brief History - From Admission


This is a 31-year-old female with history of type 1 diabetes who presented with 

"not feeling well."  Patient stated that she came to ED because she did not 

feel well.  She stated that she had mild abdominal pain earlier but that 

resolved.  She also had episode of emesis described as clear that resolved.  

Patient also said that she had a headache.  She stated that she knew it was her 

diabetes as she came into the hospital.  Patient does have good urine output.  

She stated that she changed insurance over a month ago and she cannot find a 

provider that will take formerly [facility].  She stated that she wants to keep her 

current provider but he does not take that type of insurance.  She stated that 

for the past months she has not been taking her insulin.  Prior she was on 

Novolin 70/30 40-50 at night and NovoLog 10 units before meals.  She also 

stated that she feels dry/dehydrated.





All other review symptoms reviewed and are negative.


CBC/BMP:  


8/17/17 0930                                                                   

             8/18/17 1905





Significant Findings





Laboratory Tests








Test 8/17/17 8/17/17 8/17/17 8/18/17





 09:25 09:30 20:55 04:15


 


Urine Glucose (UA) 1000 mg/dL   





 (NEG)   


 


Urine Ketones 150 mg/dL (NEG)   


 


Urine Leukocyte Esterase MOD  (NEG)   


 


Hematocrit  47.0 %  





  (35.0-46.0)  


 


Neutrophils (%) (Auto)  74.3 %  





  (16.0-70.0)  


 


Sodium Level  121 MEQ/L 129 MEQ/L 





  (136-145) (136-145) 


 


Chloride Level  88 MEQ/L  109 MEQ/L





  ()  ()


 


Carbon Dioxide Level  17.6 MEQ/L 20.5 MEQ/L 18.9 MEQ/L





  (21.0-32.0) (21.0-32.0) (21.0-32.0)


 


Creatinine  1.34 MG/DL 1.04 MG/DL 





  (0.50-1.00) (0.50-1.00) 


 


Estimat Glomerular Filtration  55 ML/MIN (>89) 74 ML/MIN (>89) 





Rate    


 


Random Glucose  696 MG/ MG/DL 54 MG/DL





  () () ()


 


Alkaline Phosphatase  207 U/L  





  ()  


 


Total Protein  9.8 GM/DL  





  (6.4-8.2)  


 


B-Hydroxybutyrate  5.35 MMOL/L  1.15 MMOL/L





  (0.00-0.39)  (0.00-0.39)


 


Phosphorus Level   2.0 MG/DL 1.9 MG/DL





   (2.5-4.9) (2.5-4.9)


 


Hemoglobin A1c    13.6 %





    (4.3-6.0)


 


    





Test 8/18/17 8/18/17  





 13:20 19:05  


 


Sodium Level 133 MEQ/L 133 MEQ/L  





 (136-145) (136-145)  


 


Random Glucose 224 MG/ MG/DL  





 () ()  


 


Phosphorus Level 1.4 MG/DL 1.7 MG/DL  





 (2.5-4.9) (2.5-4.9)  








PE at Discharge


GENERAL: Very thin female in no acute distress


CARDIOVASCULAR: Regular rate and rhythm without murmurs, gallops, or rubs. 


RESPIRATORY: Breath sounds equal bilaterally. No accessory muscle use.


GASTROINTESTINAL: Abdomen soft, non-tender, nondistended. 


MUSCULOSKELETAL: No cyanosis, or edema. 


BACK: Nontender without obvious deformity. No CVA tenderness.





Pt update on day of discharge


Follow for DKA


Patient had no complaints.  She stated that she has a good appetite and is 

tolerating oral intake.  Denies any abdominal pain, nausea/ vomiting.


Dealt with patient's nurse.


Hospital Course


31-year-old female with type 1 diabetes and noncompliant who has not been 

taking her insulin for the past month who presented with





Mild DKA/uncontrolled diabetes


-Symptoms are only significant for dehydration otherwise they're pretty mild.  

Labs reviewed no anion gap.  Beta hydroxybutyrate elevated.  Bicarbonate 17.5.


-Follow-up labs reviewed improved at the hospital course and within normalized.

  Her blood sugars were labile in which a few time she was hypoglycemic.


-Her insulin was adjusted based on her trunk.  Most likely patient is a brittle 

diabetic.  She was put on discharge Novolin 70/30 5 units twice a day and on a 

low-dose insulin sliding scale.


-Patient told to record her blood sugars and bring up appointment with her 

primary care physician for adjustment.  Extensive education on insulin use and 

adjustment given to patient.  Educated also on hypoglycemia management.





Pseudohyponatremia


-Corrected sodium level is 141.


-Continue to monitor.


-Neuro checks.





Renal sufficiency


-Patient was given IV fluids.


-Resolved.


Pt Condition on Discharge:  Good


Discharge Disposition:  Discharge Home


Discharge Time:  <= 30 minutes


Discharge Instructions


DIET: Follow Instructions for:  Diabetic Diet


Activities you can perform:  Regular-No Restrictions


Follow up Referrals:  


PCP Follow-up - 1 Week





New Medications:  


Insulin Aspart Protam-Asp 70-30 Inj (Novolog Mix 70-30 FlexPen Inj) 300 Unit/3 

Ml Pen


5 UNITS SQ BID Blood Sugar Management #1 Ref 0 PEN


Insulin Aspart Inj (Novolog Inj) 100 Unit/Ml Inj


1 UNIT SQ ACHS SLIDING SCALE blood sugar >150-180 give 2 units before meal 

blood sugar 181-200 give 3 units before meal blood sugar 201-220 give 4 units 

before meal blood sugar >221 give 5 units before meal elevated blood sugar #1 

Ref 0 INJECTION


 


Discontinued Medications:  


Insulin Detemir Inj (Levemir Inj) 1,000 unit/ 10 ML Vial


10 UNITS SQ HS Do not mix with any other Insulin. Blood Sugar Management Ref 0 

VIAL


Insulin Human Regular Inj (Novolin R Inj) 1,000 Unit/10 Ml Vial


10 UNITS SQ TIDAC Blood Sugar Management #10 Ref 0 ML











Mone Mcmahon MD Aug 19, 2017 12:25

## 2017-08-19 NOTE — HHI.DCPOC
Discharge Care Plan


Diagnosis:  


(1) DKA (diabetic ketoacidoses)


(2) Diabetes type I


Goals to Promote Your Health


* To prevent worsening of your condition and complications


* To maintain your health at the optimal level


Directions to Meet Your Goals


*** Take your medications as prescribed


*** Follow your dietary instruction


*** Follow activity as directed








*** Keep your appointments as scheduled


*** Take your immunizations and boosters as scheduled


*** If your symptoms worsen call your PCP, if no PCP go to Urgent Care Center 

or Emergency Room***


*** Smoking is Dangerous to Your Health. Avoid second hand smoke***


***Call the 24-hour hour crisis hotline for domestic abuse at 1-890.662.3507***








Mone Mcmahon MD Aug 19, 2017 12:24

## 2017-11-12 ENCOUNTER — HOSPITAL ENCOUNTER (INPATIENT)
Dept: HOSPITAL 17 - NEPC | Age: 32
LOS: 3 days | Discharge: HOME | DRG: 638 | End: 2017-11-15
Attending: HOSPITALIST | Admitting: HOSPITALIST
Payer: MEDICAID

## 2017-11-12 VITALS
DIASTOLIC BLOOD PRESSURE: 95 MMHG | OXYGEN SATURATION: 100 % | RESPIRATION RATE: 42 BRPM | SYSTOLIC BLOOD PRESSURE: 143 MMHG | TEMPERATURE: 98.3 F | HEART RATE: 100 BPM

## 2017-11-12 VITALS
SYSTOLIC BLOOD PRESSURE: 120 MMHG | HEART RATE: 88 BPM | RESPIRATION RATE: 16 BRPM | DIASTOLIC BLOOD PRESSURE: 70 MMHG | OXYGEN SATURATION: 98 %

## 2017-11-12 VITALS
SYSTOLIC BLOOD PRESSURE: 101 MMHG | DIASTOLIC BLOOD PRESSURE: 57 MMHG | HEART RATE: 95 BPM | RESPIRATION RATE: 16 BRPM | OXYGEN SATURATION: 100 % | TEMPERATURE: 99.4 F

## 2017-11-12 VITALS — HEIGHT: 64 IN | BODY MASS INDEX: 18.03 KG/M2 | WEIGHT: 105.6 LBS

## 2017-11-12 VITALS — OXYGEN SATURATION: 99 %

## 2017-11-12 VITALS — HEART RATE: 90 BPM

## 2017-11-12 VITALS — HEART RATE: 88 BPM

## 2017-11-12 DIAGNOSIS — Z79.4: ICD-10-CM

## 2017-11-12 DIAGNOSIS — E87.0: ICD-10-CM

## 2017-11-12 DIAGNOSIS — E86.0: ICD-10-CM

## 2017-11-12 DIAGNOSIS — R00.0: ICD-10-CM

## 2017-11-12 DIAGNOSIS — N17.9: ICD-10-CM

## 2017-11-12 DIAGNOSIS — E10.10: Primary | ICD-10-CM

## 2017-11-12 DIAGNOSIS — Z91.19: ICD-10-CM

## 2017-11-12 LAB
ALP SERPL-CCNC: 203 U/L (ref 45–117)
ALT SERPL-CCNC: 27 U/L (ref 10–53)
ANION GAP SERPL CALC-SCNC: 18 MEQ/L (ref 5–15)
ANION GAP SERPL CALC-SCNC: 7 MEQ/L (ref 5–15)
AST SERPL-CCNC: 26 U/L (ref 15–37)
B-OH-BUTYR SERPL-SCNC: 2.37 MMOL/L (ref 0–0.39)
BASOPHILS # BLD AUTO: 0.1 TH/MM3 (ref 0–0.2)
BASOPHILS NFR BLD: 0.6 % (ref 0–2)
BILIRUB SERPL-MCNC: 0.4 MG/DL (ref 0.2–1)
BLOOD GAS VENOUS BASE EXCESS: -3.4 MMOL/L (ref -2–2)
BLOOD GAS VENOUS HCO3: 20 MMOL/L (ref 22–26)
BLOOD GAS VENOUS PCO2: 32 MMHG (ref 44–48)
BLOOD GAS VENOUS PH: 7.42 (ref 7.36–7.4)
BLOOD GAS VENOUS PO2: 13 MMHG (ref 35–40)
BUN SERPL-MCNC: 12 MG/DL (ref 7–18)
BUN SERPL-MCNC: 6 MG/DL (ref 7–18)
CD3+CD4+ CELLS # BLD: 16 % (ref 70–76)
CHLORIDE SERPL-SCNC: 112 MEQ/L (ref 98–107)
CHLORIDE SERPL-SCNC: 79 MEQ/L (ref 98–107)
COLOR UR: (no result)
COMMENT (UR): (no result)
CRITICAL VALUE: YES
CULTURE IF INDICATED: (no result)
DRAW SITE: (no result)
EOSINOPHIL # BLD: 0.1 TH/MM3 (ref 0–0.4)
EOSINOPHIL NFR BLD: 0.8 % (ref 0–4)
ERYTHROCYTE [DISTWIDTH] IN BLOOD BY AUTOMATED COUNT: 13.4 % (ref 11.6–17.2)
GFR SERPLBLD BASED ON 1.73 SQ M-ARVRAT: 132 ML/MIN (ref 89–?)
GFR SERPLBLD BASED ON 1.73 SQ M-ARVRAT: 55 ML/MIN (ref 89–?)
GLUCOSE UR STRIP-MCNC: 1000 MG/DL
HCO3 BLD-SCNC: 20.8 MEQ/L (ref 21–32)
HCO3 BLD-SCNC: 23.4 MEQ/L (ref 21–32)
HCT VFR BLD CALC: 44.6 % (ref 35–46)
HEMO FLAGS: (no result)
HGB UR QL STRIP: (no result)
KETONES UR STRIP-MCNC: 80 MG/DL
LACTIC ACID GHOST: (no result)
LYMPHOCYTES # BLD AUTO: 1.8 TH/MM3 (ref 1–4.8)
LYMPHOCYTES NFR BLD AUTO: 16.3 % (ref 9–44)
MAGNESIUM SERPL-MCNC: 1.8 MG/DL (ref 1.5–2.5)
MCH RBC QN AUTO: 33.2 PG (ref 27–34)
MCHC RBC AUTO-ENTMCNC: 32.8 % (ref 32–36)
MCV RBC AUTO: 101.2 FL (ref 80–100)
METHAMPHET UR QL: 3.1 VOL % (ref 9–17)
MONOCYTES NFR BLD: 5.2 % (ref 0–8)
MUCOUS THREADS #/AREA URNS LPF: (no result) /LPF
NEUTROPHILS # BLD AUTO: 8.3 TH/MM3 (ref 1.8–7.7)
NEUTROPHILS NFR BLD AUTO: 77.1 % (ref 16–70)
NITRITE UR QL STRIP: (no result)
O2/TOTAL GAS SETTING VFR VENT: 21 %
OXYGEN DEVICE: (no result)
PLATELET # BLD: 325 TH/MM3 (ref 150–450)
POTASSIUM SERPL-SCNC: 3.7 MEQ/L (ref 3.5–5.1)
POTASSIUM SERPL-SCNC: 4.4 MEQ/L (ref 3.5–5.1)
RBC # BLD AUTO: 4.41 MIL/MM3 (ref 4–5.3)
SODIUM SERPL-SCNC: 118 MEQ/L (ref 136–145)
SODIUM SERPL-SCNC: 142 MEQ/L (ref 136–145)
SP GR UR STRIP: 1.03 (ref 1–1.03)
SQUAMOUS #/AREA URNS HPF: <1 /HPF (ref 0–5)
STAT: YES
TEMP CORR TO: 98.6
WBC # BLD AUTO: 10.8 TH/MM3 (ref 4–11)

## 2017-11-12 PROCEDURE — 82948 REAGENT STRIP/BLOOD GLUCOSE: CPT

## 2017-11-12 PROCEDURE — 84100 ASSAY OF PHOSPHORUS: CPT

## 2017-11-12 PROCEDURE — 82805 BLOOD GASES W/O2 SATURATION: CPT

## 2017-11-12 PROCEDURE — 83690 ASSAY OF LIPASE: CPT

## 2017-11-12 PROCEDURE — 80048 BASIC METABOLIC PNL TOTAL CA: CPT

## 2017-11-12 PROCEDURE — 87641 MR-STAPH DNA AMP PROBE: CPT

## 2017-11-12 PROCEDURE — 81001 URINALYSIS AUTO W/SCOPE: CPT

## 2017-11-12 PROCEDURE — 80053 COMPREHEN METABOLIC PANEL: CPT

## 2017-11-12 PROCEDURE — 82010 KETONE BODYS QUAN: CPT

## 2017-11-12 PROCEDURE — 83605 ASSAY OF LACTIC ACID: CPT

## 2017-11-12 PROCEDURE — 96374 THER/PROPH/DIAG INJ IV PUSH: CPT

## 2017-11-12 PROCEDURE — 85025 COMPLETE CBC W/AUTO DIFF WBC: CPT

## 2017-11-12 PROCEDURE — 90686 IIV4 VACC NO PRSV 0.5 ML IM: CPT

## 2017-11-12 PROCEDURE — 96361 HYDRATE IV INFUSION ADD-ON: CPT

## 2017-11-12 PROCEDURE — 84155 ASSAY OF PROTEIN SERUM: CPT

## 2017-11-12 PROCEDURE — 83735 ASSAY OF MAGNESIUM: CPT

## 2017-11-12 PROCEDURE — 83036 HEMOGLOBIN GLYCOSYLATED A1C: CPT

## 2017-11-12 PROCEDURE — 93005 ELECTROCARDIOGRAM TRACING: CPT

## 2017-11-12 RX ADMIN — SODIUM BICARBONATE SCH MLS/HR: 84 INJECTION, SOLUTION INTRAVENOUS at 21:05

## 2017-11-12 RX ADMIN — STANDARDIZED SENNA CONCENTRATE AND DOCUSATE SODIUM SCH TAB: 8.6; 5 TABLET, FILM COATED ORAL at 21:04

## 2017-11-12 RX ADMIN — SODIUM BICARBONATE SCH MLS/HR: 84 INJECTION, SOLUTION INTRAVENOUS at 16:01

## 2017-11-12 RX ADMIN — POTASSIUM CHLORIDE PRN MLS/HR: 200 INJECTION, SOLUTION INTRAVENOUS at 18:27

## 2017-11-12 RX ADMIN — HEPARIN SODIUM SCH UNITS: 10000 INJECTION, SOLUTION INTRAVENOUS; SUBCUTANEOUS at 18:27

## 2017-11-12 RX ADMIN — POTASSIUM CHLORIDE PRN MLS/HR: 200 INJECTION, SOLUTION INTRAVENOUS at 16:31

## 2017-11-12 NOTE — HHI.HP
HPI


Service


Critical Care Medicine


Primary Care Physician


Unknown


Admission Diagnosis





hyperosmolar non ketosis, hyperglycemia, lactic acidosis, dehydratio


Diagnosis:  


(1) Diabetic hyperosmolar non-ketotic state


Diagnosis:  Principal





(2) DKA (diabetic ketoacidoses)


Diagnosis:  Principal





(3) SHINE (acute kidney injury)


Diagnosis:  Principal





(4) Volume depletion


Diagnosis:  Principal





(5) Diabetes type I


Diagnosis:  Secondary





(6) Pseudohypnatremia


Diagnosis:  Secondary





Chief Complaint:  


Nausea/vomting,  DKA


Travel History


International Travel<30 Days:  No


Contact w/Intl Traveler <30 Da:  No


Traveled to Known Affected Are:  No


History of Present Illness


The patient is a 32-year-old after American female with Type DM who presented 

to the emergency department for nausea, vomiting, and dehydration. Blood sugar 

was reading "high".  Patient was on Levemir 45 units daily (per ED documentation

) and NovoLog SSI and she states that her physician took her off of Levemir and 

she is only been using sliding scale NovoLog. Patient is a very poor historian 

and cannot remember the exact doses of insulin.  In the ER patient received 2 L 

normal saline bolus. The patient's lactic acid was 5.1, sodium was 118, glucose 

925, anion gap 18 but normal pH on VBG.  Beta hydroxybutyrate was 2.5.  Patient 

was placed on insulin drip and the critical care was asked to admit. She has 

mild DKA but mostly appears to be hyperosmolar non-ketosis





Review of Systems


ROS Limitations:  Other (as per HPI)





Past Family Social History


Allergies:  


Coded Allergies:  


     No Known Allergies (Verified  Allergy, Unknown, 17)


Past Medical History


Type 1 diabetes


Noncompliance


Past Surgical History


Tubal ligation


Reported Medications


Novolog Mix 70-30 FlexPen Inj (Insulin Aspart Protam-Asp 70-30 Inj) 300 Unit/3 

Ml Pen Units SQ ACHS SLIDING SCALE


Active Ordered Medications


On insulin infusion


Family History


Father had DM


Social History


No alcohol or tobacco use





Physical Exam


Vital Signs





Vital Signs








  Date Time  Temp Pulse Resp B/P (MAP) Pulse Ox O2 Delivery O2 Flow Rate FiO2


 


17 14:30 98.3 100 42 143/95 (111) 100   








Physical Exam


GENERAL: Awake, alert, 32 year-old female appears dehydrated


SKIN: Warm/dry.


HEAD: Atraumatic. Normocephalic. 


EYES: Pupils equal and round. No scleral icterus. No injection or drainage. 


ENT: Dry mucous membranes.


NECK: Trachea midline. No JVD. 


CARDIOVASCULAR: Tachycardic rate and rhythm.  No murmur appreciated. 


RESPIRATORY: Clear to auscultation. Breath sounds equal bilaterally. 


GASTROINTESTINAL: Abdomen soft, non-tender, nondistended.  No rebound 

tenderness.


MUSCULOSKELETAL: No edema. 


NEUROLOGICAL: Awake and alert. Motor grossly within normal limits. Normal 

speech.


.


Laboratory





Laboratory Tests








Test


  17


14:50 17


14:53


 


White Blood Count 10.8  


 


Red Blood Count 4.41  


 


Hemoglobin 14.6  


 


Hematocrit 44.6  


 


Mean Corpuscular Volume 101.2  


 


Mean Corpuscular Hemoglobin 33.2  


 


Mean Corpuscular Hemoglobin


Concent 32.8 


  


 


 


Red Cell Distribution Width 13.4  


 


Platelet Count 325  


 


Mean Platelet Volume 9.1  


 


Neutrophils (%) (Auto) 77.1  


 


Lymphocytes (%) (Auto) 16.3  


 


Monocytes (%) (Auto) 5.2  


 


Eosinophils (%) (Auto) 0.8  


 


Basophils (%) (Auto) 0.6  


 


Neutrophils # (Auto) 8.3  


 


Lymphocytes # (Auto) 1.8  


 


Monocytes # (Auto) 0.6  


 


Eosinophils # (Auto) 0.1  


 


Basophils # (Auto) 0.1  


 


CBC Comment DIFF FINAL  


 


Differential Comment   


 


Blood Urea Nitrogen 12  


 


Creatinine 1.34  


 


Random Glucose 925  


 


Total Protein 8.9  


 


Albumin 4.0  


 


Calcium Level 9.9  


 


Alkaline Phosphatase 203  


 


Aspartate Amino Transf


(AST/SGOT) 26 


  


 


 


Alanine Aminotransferase


(ALT/SGPT) 27 


  


 


 


Total Bilirubin 0.4  


 


Sodium Level 118  


 


Potassium Level 4.4  


 


Chloride Level 79  


 


Carbon Dioxide Level 20.8  


 


Anion Gap 18  


 


Estimat Glomerular Filtration


Rate 55 


  


 


 


Lactic Acid Level 5.1  


 


Lipase 125  


 


B-Hydroxybutyrate 2.37  


 


Blood Gas Puncture Site  IV 


 


Blood Gas Patient Temperature  98.6 


 


Venous Blood pH  7.42 


 


Venous Blood Partial Pressure


CO2 


  32 


 


 


Venous Blood Partial Pressure


O2 


  13 


 


 


Venous Blood HCO3  20 


 


Venous Blood Oxygen Saturation  16 


 


Venous Blood Oxygen Content  3.1 


 


Venous Blood Base Excess  -3.4 


 


Oxygen Delivery Device  ROOM AIR 


 


Blood Gas Inspired Oxygen  21 








Result Diagram:  


17 1450                                                                  

              17 1450





Imaging


No new imaging studies





Caprini VTE Risk Assessment


Caprini VTE Risk Assessment:  Mod/High Risk (score >= 2)


Caprini Risk Assessment Model











 Point Value = 1          Point Value = 2  Point Value = 3  Point Value = 5


 


Age 41-60


Minor surgery


BMI > 25 kg/m2


Swollen legs


Varicose veins


Pregnancy or postpartum


History of unexplained or recurrent


   spontaneous 


Oral contraceptives or hormone


   replacement


Sepsis (< 1 month)


Serious lung disease, including


   pneumonia (< 1 month)


Abnormal pulmonary function


Acute myocardial infarction


Congestive heart failure (< 1 month)


History of inflammatory bowel disease


Medical patient at bed rest Age 61-74


Arthroscopic surgery


Major open surgery (> 45 min)


Laparoscopic surgery (> 45 min)


Malignancy


Confined to bed (> 72 hours)


Immobilizing plaster cast


Central venous access Age >= 75


History of VTE


Family history of VTE


Factor V Leiden


Prothrombin 50759A


Lupus anticoagulant


Anticardiolipin antibodies


Elevated serum homocysteine


Heparin-induced thrombocytopenia


Other congenital or acquired


   thrombophilia Stroke (< 1 month)


Elective arthroplasty


Hip, pelvis, or leg fracture


Acute spinal cord injury (< 1 month)








Prophylaxis Regimen











   Total Risk


Factor Score Risk Level Prophylaxis Regimen


 


0-1      Low Early ambulation


 


2 Moderate Order ONE of the following:


*Sequential Compression Device (SCD)


*Heparin 5000 units SQ BID


 


3-4 Higher Order ONE of the following medications:


*Heparin 5000 units SQ TID


*Enoxaparin/Lovenox 40 mg SQ daily (WT < 150 kg, CrCl > 30 mL/min)


*Enoxaparin/Lovenox 30 mg SQ daily (WT < 150 kg, CrCl > 10-29 mL/min)


*Enoxaparin/Lovenox 30 mg SQ BID (WT < 150 kg, CrCl > 30 mL/min)


AND/OR


*Sequential Compression Device (SCD)


 


5 or more Highest Order ONE of the following medications:


*Heparin 5000 units SQ TID (Preferred with Epidurals)


*Enoxaparin/Lovenox 40 mg SQ daily (WT < 150 kg, CrCl > 30 mL/min)


*Enoxaparin/Lovenox 30 mg SQ daily (WT < 150 kg, CrCl > 10-29 mL/min)


*Enoxaparin/Lovenox 30 mg SQ BID (WT < 150 kg, CrCl > 30 mL/min)


AND


*Sequential Compression Device (SCD)











Assessment and Plan


Assessment and Plan


NEURO: 


Lethargy


- Most likely secondary to dehydration, continue aggressive hydration





RESP: 


- Nasal cannula oxygen if needed





CV: 


Dehydration 


Mild tachycardia 


- Normal saline IV fluids 2l bolus and continue IV fluids per protocol





GI: 


- NPO until nausea vomiting improves


- IV famotidine





: 


Acute kidney insufficiency 


- Monitor renal function closely.  Continue aggressive hydration as above





ID: 


- No indication for antibiotics at this time, check UA





HEME: 


- Monitor CBC, CMP





ENDO: 


Uncontrolled diabetes with hyperosmolar nonketotic state


Mild DKA


- IV insulin and IV fluids per DKA protocol


- Transition to subcutaneous insulin with long-acting insulin once patient can 

eat and gap is closed


- Aggressive IV hydration





PROPH: 


- Bilateral lower extremity SCDs. Lovenox, Famotidine





LINES: 


- Utilize peripheral IVs, central line if needed





Level 3 new admit


Code Status


Full


Discussed Condition With


Dr. Riley





Problem Qualifiers





(1) Diabetes type I:  


Qualified Codes:  E10.8 - Type 1 diabetes mellitus with unspecified 

complications








Aundrea Sanders MD 2017 16:30

## 2017-11-12 NOTE — PD
HPI


Chief Complaint:  Diabetic


Time Seen by Provider:  14:31


Travel History


International Travel<30 days:  No


Contact w/Intl Traveler<30days:  No


Traveled to known affect area:  No





History of Present Illness


HPI


The patient is a 32-year-old after American female who presents to the 

emergency department for nausea, vomiting, decreased oral intake.  The patient 

is a 3 day history of persistent nausea and vomiting with poor oral intake.  

The patient states she's had some mild epigastric discomfort for the last 

several days, has had poor appetite.  She then developed nausea and vomiting 

earlier today that was intractable.  The patient checked her blood sugar and 

states it was read as "high ".  The patient does have a history of type 1 

diabetes diagnosed one year ago and was on Levemir 45 units daily as well as 

NovoLog.  However, the patient states her last physician took her off of 

Levemir and she is only been using sliding scale NovoLog.  The patient uses 

NovoLog 5 units subcutaneously earlier today.  She denies any dysuria, frequency

, or urgency.  She does note polyuria and polydipsia.  The patient denies any 

fever, chills, sweats, chest pain, or shortness of breath.





PFSH


Past Medical History


Autoimmune Disease:  No


Blood Disorders:  No


Cardiovascular Problems:  Yes ("RAPID HEART RATE")


Diabetes:  Yes


Patient Takes Glucophage:  No


Diminished Hearing:  No


Endocrine:  No


Genitourinary:  No


Immune Disorder:  No


Musculoskeletal:  No


Neurologic:  No


Reproductive:  No


Respiratory:  No


Immunizations Current:  No


Tetanus Vaccination:  > 5 Years


Influenza Vaccination:  No


Pregnant?:  Not Pregnant


:  5


Para:  4


Miscarriage:  1


:  0


Tubal Ligation:  Yes





Past Surgical History


Other Surgery:  No





Social History


Alcohol Use:  No


Tobacco Use:  No


Substance Use:  No





Allergies-Medications


(Allergen,Severity, Reaction):  


Coded Allergies:  


     No Known Allergies (Verified  Allergy, Unknown, 17)


Reported Meds & Prescriptions





Reported Meds & Active Scripts


Active


Novolog Inj (Insulin Aspart) 100 Unit/Ml Inj 1 Unit SQ ACHS SLIDING SCALE


     blood sugar >150-180 give 2 units before meal


     blood sugar 181-200 give 3 units before meal


     blood sugar 201-220 give 4 units before meal


     blood sugar >221 give 5 units before meal


Reported


Novolog Mix 70-30 FlexPen Inj (Insulin Aspart Protam-Asp 70-30 Inj) 300 Unit/3 

Ml Pen Units SQ ACHS SLIDING SCALE








Review of Systems


Except as stated in HPI:  all other systems reviewed are Neg


General / Constitutional:  No: Fever


Cardiovascular:  No: Chest Pain or Discomfort


Respiratory:  No: Shortness of Breath


Gastrointestinal:  Positive: Nausea, Vomiting, Abdominal Pain, No: Diarrhea


Genitourinary:  No: Dysuria


Musculoskeletal:  Positive: Weakness


Endocrine:  Positive: Polyuria, Polydipsia





Physical Exam


Narrative


GENERAL: Awake, alert, actively vomiting 32 year-old female appears her stated 

age and is hyperventilating.


SKIN: Focused skin assessment warm/dry.


HEAD: Atraumatic. Normocephalic. 


EYES: Pupils equal and round. No scleral icterus. No injection or drainage. 


ENT: No nasal bleeding or discharge.  Slightly dry mucous membranes.


NECK: Trachea midline. No JVD. 


CARDIOVASCULAR: Regular rate and rhythm.  No murmur appreciated.  Heart rate in 

the 90s.


RESPIRATORY: No accessory muscle use. Clear to auscultation. Breath sounds 

equal bilaterally. 


GASTROINTESTINAL: Abdomen soft, non-tender, nondistended.  No rebound 

tenderness.


MUSCULOSKELETAL: No obvious deformities. No clubbing.  No cyanosis.  No edema. 


NEUROLOGICAL: Awake and alert. No obvious cranial nerve deficits.  Motor 

grossly within normal limits. Normal speech.


PSYCHIATRIC: Appropriate mood and affect; insight and judgment normal.





Data


Data


Last Documented VS





Vital Signs








  Date Time  Temp Pulse Resp B/P (MAP) Pulse Ox O2 Delivery O2 Flow Rate FiO2


 


17 14:30 98.3 100 42 143/95 (111) 100   








Orders





 Orders


Ondansetron Inj (Zofran Inj) (17 14:44)


Electrocardiogram (17 14:45)


Complete Blood Count With Diff (17 14:45)


Comprehensive Metabolic Panel (17 14:45)


Beta Hydroxybutyrate (Acetone) (17 14:45)


Lactic Acid (17 14:45)


Urinalysis - C+S If Indicated (17 14:45)


Blood Gas Venous (Vbg) (17 14:45)


Blood Glucose (17 14:45)


Blood Glucose (17 15:45)


Ecg Monitoring (17 14:45)


Iv Access Insert/Monitor (17 14:45)


Oximetry (17 14:45)


NPO (17 14:45)


Sodium Chlor 0.9% 1000 Ml Inj (Ns 1000 M (17 14:45)


Sodium Chlor 0.9% 1000 Ml Inj (Ns 1000 M (17 15:15)


Sodium Chloride 0.9% Flush (Ns Flush) (17 14:45)


Lipase (17 14:45)


Ondansetron Inj (Zofran Inj) (17 14:45)


Cardiac Monitor / Telemetry OANH.Q8H (17 16:01)


^ Insert Iv (17 16:01)


Diet Npo (17 Dinner)


Sodium Chlor 0.9% 1000 Ml Inj (Ns 1000 M (17 16:01)


Dext 5%-Nacl 0.9% 1000 Ml Inj (D5w-Ns 10 (17 16:01)


Insulin Human Regular Inj (Novolin R Inj (17 16:15)


Insulin Regular (Iv Infusion) (Novolin R (17 16:15)


Potassium Chlor 40 Meq Premix (Kcl 40 Me (17 16:15)


Potassium Chlor 40 Meq Premix (Kcl 40 Me (17 16:15)


Potassium Chlor 20 Meq Premix (Kcl 20 Me (17 16:15)


Sodium Bicarbonate 8.4% Inj (Sodium Bica (17 16:15)


Sodium Bicarbonate 8.4% Inj (Sodium Bica (17 16:15)


Sodium Phosphate Inj (Sodium Phosphate I (17 16:15)


Hemoglobin (Hgb) A1c (17 16:01)


Basic Metabolic Panel (Bmp) (17 21:01)


Basic Metabolic Panel (Bmp) (17 03:01)


Basic Metabolic Panel (Bmp) (17 09:01)


Basic Metabolic Panel (Bmp) (17 15:01)


Magnesium (Mg) (17 21:01)


Magnesium (Mg) (17 03:01)


Magnesium (Mg) (17 09:01)


Magnesium (Mg) (17 15:01)


Phosphorus (Po4) (17 21:01)


Phosphorus (Po4) (17 03:01)


Phosphorus (Po4) (17 09:01)


Phosphorus (Po4) (17 15:)


Beta Hydroxybutyrate (Acetone) (17 03:01)


Beta Hydroxybutyrate (Acetone) (17 15:01)


Admit Order (Ed Use Only) (17 16:09)





Labs





Laboratory Tests








Test


  17


14:50 17


14:53


 


White Blood Count 10.8 TH/MM3  


 


Red Blood Count 4.41 MIL/MM3  


 


Hemoglobin 14.6 GM/DL  


 


Hematocrit 44.6 %  


 


Mean Corpuscular Volume 101.2 FL  


 


Mean Corpuscular Hemoglobin 33.2 PG  


 


Mean Corpuscular Hemoglobin


Concent 32.8 % 


  


 


 


Red Cell Distribution Width 13.4 %  


 


Platelet Count 325 TH/MM3  


 


Mean Platelet Volume 9.1 FL  


 


Neutrophils (%) (Auto) 77.1 %  


 


Lymphocytes (%) (Auto) 16.3 %  


 


Monocytes (%) (Auto) 5.2 %  


 


Eosinophils (%) (Auto) 0.8 %  


 


Basophils (%) (Auto) 0.6 %  


 


Neutrophils # (Auto) 8.3 TH/MM3  


 


Lymphocytes # (Auto) 1.8 TH/MM3  


 


Monocytes # (Auto) 0.6 TH/MM3  


 


Eosinophils # (Auto) 0.1 TH/MM3  


 


Basophils # (Auto) 0.1 TH/MM3  


 


CBC Comment DIFF FINAL  


 


Differential Comment   


 


Blood Urea Nitrogen 12 MG/DL  


 


Creatinine 1.34 MG/DL  


 


Random Glucose 925 MG/DL  


 


Total Protein 8.9 GM/DL  


 


Albumin 4.0 GM/DL  


 


Calcium Level 9.9 MG/DL  


 


Alkaline Phosphatase 203 U/L  


 


Aspartate Amino Transf


(AST/SGOT) 26 U/L 


  


 


 


Alanine Aminotransferase


(ALT/SGPT) 27 U/L 


  


 


 


Total Bilirubin 0.4 MG/DL  


 


Sodium Level 118 MEQ/L  


 


Potassium Level 4.4 MEQ/L  


 


Chloride Level 79 MEQ/L  


 


Carbon Dioxide Level 20.8 MEQ/L  


 


Anion Gap 18 MEQ/L  


 


Estimat Glomerular Filtration


Rate 55 ML/MIN 


  


 


 


Lactic Acid Level 5.1 mmol/L  


 


Lipase 125 U/L  


 


B-Hydroxybutyrate 2.37 MMOL/L  


 


Blood Gas Puncture Site  IV 


 


Blood Gas Patient Temperature  98.6 


 


Venous Blood pH  7.42 


 


Venous Blood Partial Pressure


CO2 


  32 mmHg 


 


 


Venous Blood Partial Pressure


O2 


  13 mmHg 


 


 


Venous Blood HCO3  20 mmol/L 


 


Venous Blood Oxygen Saturation  16 % 


 


Venous Blood Oxygen Content  3.1 Vol % 


 


Venous Blood Base Excess  -3.4 mmol/L 


 


Oxygen Delivery Device  ROOM AIR 


 


Blood Gas Inspired Oxygen  21 % 











MDM


Medical Decision Making


Medical Screen Exam Complete:  Yes


Emergency Medical Condition:  Yes


Medical Record Reviewed:  Yes


Interpretation(s)


EKG reveals normal sinus rhythm with a rate 89.  No ischemic changes noted.








Laboratory Tests








Test


  17


14:50 17


14:53


 


White Blood Count 10.8 TH/MM3  


 


Red Blood Count 4.41 MIL/MM3  


 


Hemoglobin 14.6 GM/DL  


 


Hematocrit 44.6 %  


 


Mean Corpuscular Volume 101.2 FL  


 


Mean Corpuscular Hemoglobin 33.2 PG  


 


Mean Corpuscular Hemoglobin


Concent 32.8 % 


  


 


 


Red Cell Distribution Width 13.4 %  


 


Platelet Count 325 TH/MM3  


 


Mean Platelet Volume 9.1 FL  


 


Neutrophils (%) (Auto) 77.1 %  


 


Lymphocytes (%) (Auto) 16.3 %  


 


Monocytes (%) (Auto) 5.2 %  


 


Eosinophils (%) (Auto) 0.8 %  


 


Basophils (%) (Auto) 0.6 %  


 


Neutrophils # (Auto) 8.3 TH/MM3  


 


Lymphocytes # (Auto) 1.8 TH/MM3  


 


Monocytes # (Auto) 0.6 TH/MM3  


 


Eosinophils # (Auto) 0.1 TH/MM3  


 


Basophils # (Auto) 0.1 TH/MM3  


 


CBC Comment DIFF FINAL  


 


Differential Comment   


 


Blood Urea Nitrogen 12 MG/DL  


 


Creatinine 1.34 MG/DL  


 


Random Glucose 925 MG/DL  


 


Total Protein 8.9 GM/DL  


 


Albumin 4.0 GM/DL  


 


Calcium Level 9.9 MG/DL  


 


Alkaline Phosphatase 203 U/L  


 


Aspartate Amino Transf


(AST/SGOT) 26 U/L 


  


 


 


Alanine Aminotransferase


(ALT/SGPT) 27 U/L 


  


 


 


Total Bilirubin 0.4 MG/DL  


 


Sodium Level 118 MEQ/L  


 


Potassium Level 4.4 MEQ/L  


 


Chloride Level 79 MEQ/L  


 


Carbon Dioxide Level 20.8 MEQ/L  


 


Anion Gap 18 MEQ/L  


 


Estimat Glomerular Filtration


Rate 55 ML/MIN 


  


 


 


Lactic Acid Level 5.1 mmol/L  


 


Lipase 125 U/L  


 


B-Hydroxybutyrate 2.37 MMOL/L  


 


Blood Gas Puncture Site  IV 


 


Blood Gas Patient Temperature  98.6 


 


Venous Blood pH  7.42 


 


Venous Blood Partial Pressure


CO2 


  32 mmHg 


 


 


Venous Blood Partial Pressure


O2 


  13 mmHg 


 


 


Venous Blood HCO3  20 mmol/L 


 


Venous Blood Oxygen Saturation  16 % 


 


Venous Blood Oxygen Content  3.1 Vol % 


 


Venous Blood Base Excess  -3.4 mmol/L 


 


Oxygen Delivery Device  ROOM AIR 


 


Blood Gas Inspired Oxygen  21 % 








Differential Diagnosis


Differential diagnosis includes DKA, hyperglycemia, dehydration, electrolyte 

abnormality, pancreatitis, gastroparesis, UTI, pyelonephritis, STEMI.


Narrative Course


IV was established, labs are drawn and sent, and the patient was placed on 

cardiac telemetry monitoring and continuous pulse oximetry monitoring.  EKG was 

ordered and interpreted.  The patient received 2 L of IV fluids and Zofran 4 mg 

intravenously.  VBG was obtained, the patient's pH was 7.42 with a PCO2 31.8 

and a bicarbonate of 20.3.  PH is normal, the patient does appear to be 

hyperventilating.  The patient's lactic acid was 5.1, sodium was 118 with a 

glucose of 925, consistent with pseudohyponatremia from hyperglycemia.  Patient 

is a 9 gap is 18, however, pH is normal on VBG.  The patient appears to have 

hyperosmolar non-ketosis, was administered insulin and placed on an insulin 

drip.  The patient also received 2 L of IV fluids.  Patient's lactic acid was 

5.1, she will benefit from IV fluids.  The patient will be admitted to the 

intensive care unit.  The on-call intensivist was paged for admission.


Critical Care Narrative


Aggregate critical care time was 35 minutes. Time to perform other separately 

billable procedures was not included in the critical care time. My time did not 

include minutes spent treating any other patients simultaneously or on 

activities that did not directly contribute to the patient's treatment.  





The services I provided to this patient were to treat and/or prevent clinically 

significant deterioration that could result in: Dehydration, DKA, arrhythmia, 

death.





I provided critical care services requiring my management, as noted below:


Chart data review, documentation time, medication orders and management, vital 

sign assessments/reviewing monitor data, ordering and reviewing lab tests, 

ordering and interpreting/reviewing x-rays and diagnostic studies, care of the 

patient and discussion of the patient with the admitting physicians.





Physician Communication


Physician Communication


The on-call intensivist was paged for admission.  I discussed the patient with 

Dr. Sanders who agrees with admission.





Diagnosis





 Primary Impression:  


 Diabetic hyperosmolar non-ketotic state





Admitting Information


Admitting Physician Requests:  Admit


Condition:  Serious











Marcello Riley MD 2017 15:16

## 2017-11-13 VITALS
RESPIRATION RATE: 32 BRPM | TEMPERATURE: 98.5 F | DIASTOLIC BLOOD PRESSURE: 75 MMHG | OXYGEN SATURATION: 100 % | HEART RATE: 86 BPM | SYSTOLIC BLOOD PRESSURE: 114 MMHG

## 2017-11-13 VITALS
SYSTOLIC BLOOD PRESSURE: 119 MMHG | OXYGEN SATURATION: 100 % | DIASTOLIC BLOOD PRESSURE: 77 MMHG | TEMPERATURE: 96.8 F | RESPIRATION RATE: 19 BRPM | HEART RATE: 95 BPM

## 2017-11-13 VITALS
DIASTOLIC BLOOD PRESSURE: 74 MMHG | RESPIRATION RATE: 17 BRPM | SYSTOLIC BLOOD PRESSURE: 112 MMHG | TEMPERATURE: 98.9 F | OXYGEN SATURATION: 100 % | HEART RATE: 79 BPM

## 2017-11-13 VITALS
HEART RATE: 82 BPM | RESPIRATION RATE: 20 BRPM | OXYGEN SATURATION: 100 % | SYSTOLIC BLOOD PRESSURE: 120 MMHG | DIASTOLIC BLOOD PRESSURE: 67 MMHG | TEMPERATURE: 98.3 F

## 2017-11-13 VITALS
SYSTOLIC BLOOD PRESSURE: 117 MMHG | OXYGEN SATURATION: 98 % | TEMPERATURE: 98.7 F | RESPIRATION RATE: 31 BRPM | DIASTOLIC BLOOD PRESSURE: 71 MMHG | HEART RATE: 88 BPM

## 2017-11-13 VITALS
DIASTOLIC BLOOD PRESSURE: 73 MMHG | RESPIRATION RATE: 24 BRPM | TEMPERATURE: 98.4 F | SYSTOLIC BLOOD PRESSURE: 113 MMHG | OXYGEN SATURATION: 100 % | HEART RATE: 90 BPM

## 2017-11-13 VITALS — OXYGEN SATURATION: 99 %

## 2017-11-13 VITALS — HEART RATE: 93 BPM

## 2017-11-13 VITALS — HEART RATE: 86 BPM

## 2017-11-13 VITALS — HEART RATE: 91 BPM

## 2017-11-13 VITALS — HEART RATE: 84 BPM

## 2017-11-13 LAB
ANION GAP SERPL CALC-SCNC: 6 MEQ/L (ref 5–15)
ANION GAP SERPL CALC-SCNC: 8 MEQ/L (ref 5–15)
ANION GAP SERPL CALC-SCNC: 9 MEQ/L (ref 5–15)
B-OH-BUTYR SERPL-SCNC: 0.19 MMOL/L (ref 0–0.39)
B-OH-BUTYR SERPL-SCNC: 2.49 MMOL/L (ref 0–0.39)
BUN SERPL-MCNC: 6 MG/DL (ref 7–18)
BUN SERPL-MCNC: 6 MG/DL (ref 7–18)
BUN SERPL-MCNC: 8 MG/DL (ref 7–18)
CALCIUM TP COR SERPL-MCNC: 8.2 MG/DL (ref 8.5–10.1)
CHLORIDE SERPL-SCNC: 102 MEQ/L (ref 98–107)
CHLORIDE SERPL-SCNC: 105 MEQ/L (ref 98–107)
CHLORIDE SERPL-SCNC: 110 MEQ/L (ref 98–107)
GFR SERPLBLD BASED ON 1.73 SQ M-ARVRAT: 142 ML/MIN (ref 89–?)
GFR SERPLBLD BASED ON 1.73 SQ M-ARVRAT: 185 ML/MIN (ref 89–?)
GFR SERPLBLD BASED ON 1.73 SQ M-ARVRAT: 94 ML/MIN (ref 89–?)
HCO3 BLD-SCNC: 20.1 MEQ/L (ref 21–32)
HCO3 BLD-SCNC: 21.4 MEQ/L (ref 21–32)
HCO3 BLD-SCNC: 23.7 MEQ/L (ref 21–32)
HEMOGLOBIN A1A: 1.8 %
HEMOGLOBIN AO: 67.3 %
HEMOGLOBIN LA1C: 1.9 %
HEMOGLOBIN P3: 6.1 %
HGB F MFR BLD: 4.9 %
MAGNESIUM SERPL-MCNC: 1.8 MG/DL (ref 1.5–2.5)
MAGNESIUM SERPL-MCNC: 1.9 MG/DL (ref 1.5–2.5)
MAGNESIUM SERPL-MCNC: 2 MG/DL (ref 1.5–2.5)
POTASSIUM SERPL-SCNC: 3.7 MEQ/L (ref 3.5–5.1)
POTASSIUM SERPL-SCNC: 3.8 MEQ/L (ref 3.5–5.1)
POTASSIUM SERPL-SCNC: 4 MEQ/L (ref 3.5–5.1)
SODIUM SERPL-SCNC: 132 MEQ/L (ref 136–145)
SODIUM SERPL-SCNC: 134 MEQ/L (ref 136–145)
SODIUM SERPL-SCNC: 139 MEQ/L (ref 136–145)

## 2017-11-13 RX ADMIN — INSULIN ASPART SCH: 100 INJECTION, SOLUTION INTRAVENOUS; SUBCUTANEOUS at 09:08

## 2017-11-13 RX ADMIN — HEPARIN SODIUM SCH UNITS: 10000 INJECTION, SOLUTION INTRAVENOUS; SUBCUTANEOUS at 05:51

## 2017-11-13 RX ADMIN — ACYCLOVIR SCH UNITS: 800 TABLET ORAL at 19:50

## 2017-11-13 RX ADMIN — CHLORHEXIDINE GLUCONATE SCH PACK: 500 CLOTH TOPICAL at 03:30

## 2017-11-13 RX ADMIN — INSULIN ASPART SCH: 100 INJECTION, SOLUTION INTRAVENOUS; SUBCUTANEOUS at 00:00

## 2017-11-13 RX ADMIN — STANDARDIZED SENNA CONCENTRATE AND DOCUSATE SODIUM SCH TAB: 8.6; 5 TABLET, FILM COATED ORAL at 19:51

## 2017-11-13 RX ADMIN — INSULIN ASPART SCH: 100 INJECTION, SOLUTION INTRAVENOUS; SUBCUTANEOUS at 16:00

## 2017-11-13 RX ADMIN — INSULIN ASPART SCH: 100 INJECTION, SOLUTION INTRAVENOUS; SUBCUTANEOUS at 12:00

## 2017-11-13 RX ADMIN — INSULIN ASPART SCH UNITS: 100 INJECTION, SOLUTION INTRAVENOUS; SUBCUTANEOUS at 17:00

## 2017-11-13 RX ADMIN — STANDARDIZED SENNA CONCENTRATE AND DOCUSATE SODIUM SCH TAB: 8.6; 5 TABLET, FILM COATED ORAL at 09:07

## 2017-11-13 RX ADMIN — INSULIN ASPART SCH: 100 INJECTION, SOLUTION INTRAVENOUS; SUBCUTANEOUS at 19:51

## 2017-11-13 RX ADMIN — ACYCLOVIR SCH UNITS: 800 TABLET ORAL at 09:08

## 2017-11-13 RX ADMIN — INSULIN ASPART SCH: 100 INJECTION, SOLUTION INTRAVENOUS; SUBCUTANEOUS at 03:30

## 2017-11-13 NOTE — HHI.CCPN
Subjective


Remarks/Hospital Course


The patient is a 32-year-old after American female with Type DM who presented 

to the emergency department for nausea, vomiting, and dehydration. Blood sugar 

was reading "high".  Patient was on Levemir 45 units daily (per ED documentation

) and NovoLog SSI and she states that her physician took her off of Levemir and 

she is only been using sliding scale NovoLog. Patient is a very poor historian 

and cannot remember the exact doses of insulin.  In the ER patient received 2 L 

normal saline bolus. The patient's lactic acid was 5.1, sodium was 118, glucose 

925, anion gap 18 but normal pH on VBG.  Beta hydroxybutyrate was 2.5.  Patient 

was placed on insulin drip and the critical care was asked to admit. She has 

mild DKA but mostly appears to be hyperosmolar non-ketosis





SUBJ 11/13/17 - Blood sugar control improved not adequate.  DKA resolved anion 

gap has closed, but a.m. blood sugar 245. Blood sugar transition orders given.  

Will start Levemir with pre-meal NovoLog insulin.  Diabetic educator requested





Objective





Vital Signs








  Date Time  Temp Pulse Resp B/P (MAP) Pulse Ox O2 Delivery O2 Flow Rate FiO2


 


11/13/17 08:00 98.3 82 20 120/67 (84) 100   


 


11/12/17 16:33      Room Air  


 


11/12/17 15:05        21














Intake and Output   


 


 11/13/17 11/13/17 11/14/17





 08:00 16:00 00:00


 


Intake Total 350 ml  


 


Balance 350 ml  








Result Diagram:  


11/12/17 1450                                                                  

              11/13/17 0312





Other Results





Laboratory Tests








Test


  11/12/17


14:53


 


Blood Gas Puncture Site IV 


 


Blood Gas Patient Temperature 98.6 


 


Venous Blood pH


  7.42


(7.360-7.400)


 


Venous Blood Partial Pressure


CO2 32 mmHg


(44-48)


 


Venous Blood Partial Pressure


O2 13 mmHg


(35-40)


 


Venous Blood HCO3


  20 mmol/L


(22-26)


 


Venous Blood Oxygen Saturation 16 % (70-76) 


 


Venous Blood Oxygen Content


  3.1 Vol %


(9.0-17.0)


 


Venous Blood Base Excess


  -3.4 mmol/L


(-2-2)


 


Oxygen Delivery Device ROOM AIR 


 


Blood Gas Inspired Oxygen 21 % 








Imaging


No new imaging studies


Objective Remarks


GENERAL: Awake, alert, 32 year-old female mildly tachycardic


SKIN: Warm/dry.


HEAD: Atraumatic. Normocephalic. 


EYES: Pupils equal and round. No scleral icterus. No injection or drainage. 


ENT: Dry mucous membranes.


NECK: Trachea midline. No JVD. 


CARDIOVASCULAR: Tachycardic rate and rhythm.  No murmur appreciated. 


RESPIRATORY: Clear to auscultation. Breath sounds equal bilaterally. 


GASTROINTESTINAL: Abdomen soft, non-tender, nondistended.  No rebound 

tenderness.


MUSCULOSKELETAL: No edema. 


NEUROLOGICAL: Awake and alert. Motor grossly within normal limits. Normal 

speech.


.





A/P


Assessment and Plan


NEURO: 


- Minimize any sedation





RESP: 


- Nasal cannula oxygen if needed





CV: 


Dehydration 


Mild tachycardia 


- s/p Normal saline IV fluids 4l bolus and continue IV NS 75 ml per hour





GI: 


- Start ADA diet


- DC IV famotidine





: 


Acute kidney insufficiency 


- Monitor renal function closely.  Continue aggressive hydration as above





ID: 


- No indication for antibiotics at this time, check UA





HEME: 


- Monitor CBC, CMP





ENDO: 


Uncontrolled diabetes with hyperosmolar nonketotic state


Mild DKA


- IV insulin and IV fluids per DKA protocol. DC DKA protocol. Start Levemir 10 

U q12, with Novolog 3U premeal and SSI


- Consult diabetic educator





PROPH: 


- Bilateral lower extremity SCDs. DC Lovenox, Famotidine





LINES: 


- Utilize peripheral IVs, central line if needed





Level 2 





Consult [facility] to assume care in am, transfer to Med surg. Cannot DC home due to 

inadequate sugar control in a Type 1 Diabetic











Aundrea Sanders MD Nov 13, 2017 09:39

## 2017-11-13 NOTE — EKG
Date Performed: 11/12/2017       Time Performed: 15:05:02

 

PTAGE:      32 years

 

EKG:      Sinus rhythm 

 

 POSSIBLE RIGHT ATRIAL ENLARGEMENT BORDERLINE ECG

 

PREVIOUS TRACING       : 01/19/2008 10.30.02 Since previous tracing, no significant change noted

 

DOCTOR:   Kelvin Cash  Interpretating Date/Time  11/16/2017 08:34:47

## 2017-11-14 VITALS
OXYGEN SATURATION: 100 % | HEART RATE: 86 BPM | SYSTOLIC BLOOD PRESSURE: 111 MMHG | DIASTOLIC BLOOD PRESSURE: 69 MMHG | RESPIRATION RATE: 17 BRPM | TEMPERATURE: 97.4 F

## 2017-11-14 VITALS
OXYGEN SATURATION: 98 % | TEMPERATURE: 96.3 F | DIASTOLIC BLOOD PRESSURE: 58 MMHG | SYSTOLIC BLOOD PRESSURE: 105 MMHG | HEART RATE: 87 BPM | RESPIRATION RATE: 18 BRPM

## 2017-11-14 VITALS
OXYGEN SATURATION: 100 % | SYSTOLIC BLOOD PRESSURE: 116 MMHG | HEART RATE: 93 BPM | DIASTOLIC BLOOD PRESSURE: 69 MMHG | TEMPERATURE: 96.5 F | RESPIRATION RATE: 17 BRPM

## 2017-11-14 VITALS
SYSTOLIC BLOOD PRESSURE: 122 MMHG | DIASTOLIC BLOOD PRESSURE: 65 MMHG | OXYGEN SATURATION: 100 % | HEART RATE: 84 BPM | TEMPERATURE: 98 F | RESPIRATION RATE: 17 BRPM

## 2017-11-14 VITALS
TEMPERATURE: 98.1 F | OXYGEN SATURATION: 100 % | SYSTOLIC BLOOD PRESSURE: 107 MMHG | HEART RATE: 92 BPM | DIASTOLIC BLOOD PRESSURE: 64 MMHG | RESPIRATION RATE: 18 BRPM

## 2017-11-14 RX ADMIN — ACYCLOVIR SCH UNITS: 800 TABLET ORAL at 08:48

## 2017-11-14 RX ADMIN — INSULIN ASPART SCH: 100 INJECTION, SOLUTION INTRAVENOUS; SUBCUTANEOUS at 00:19

## 2017-11-14 RX ADMIN — ACYCLOVIR SCH UNITS: 800 TABLET ORAL at 20:00

## 2017-11-14 RX ADMIN — STANDARDIZED SENNA CONCENTRATE AND DOCUSATE SODIUM SCH TAB: 8.6; 5 TABLET, FILM COATED ORAL at 08:50

## 2017-11-14 RX ADMIN — INSULIN ASPART SCH UNITS: 100 INJECTION, SOLUTION INTRAVENOUS; SUBCUTANEOUS at 17:00

## 2017-11-14 RX ADMIN — INSULIN ASPART SCH: 100 INJECTION, SOLUTION INTRAVENOUS; SUBCUTANEOUS at 12:03

## 2017-11-14 RX ADMIN — CHLORHEXIDINE GLUCONATE SCH PACK: 500 CLOTH TOPICAL at 04:00

## 2017-11-14 RX ADMIN — INSULIN ASPART SCH UNITS: 100 INJECTION, SOLUTION INTRAVENOUS; SUBCUTANEOUS at 12:03

## 2017-11-14 RX ADMIN — INSULIN ASPART SCH: 100 INJECTION, SOLUTION INTRAVENOUS; SUBCUTANEOUS at 08:00

## 2017-11-14 RX ADMIN — INSULIN ASPART SCH: 100 INJECTION, SOLUTION INTRAVENOUS; SUBCUTANEOUS at 16:00

## 2017-11-14 RX ADMIN — STANDARDIZED SENNA CONCENTRATE AND DOCUSATE SODIUM SCH TAB: 8.6; 5 TABLET, FILM COATED ORAL at 20:00

## 2017-11-14 RX ADMIN — INSULIN ASPART SCH: 100 INJECTION, SOLUTION INTRAVENOUS; SUBCUTANEOUS at 05:02

## 2017-11-14 RX ADMIN — INSULIN ASPART SCH UNITS: 100 INJECTION, SOLUTION INTRAVENOUS; SUBCUTANEOUS at 08:48

## 2017-11-14 RX ADMIN — INSULIN ASPART SCH: 100 INJECTION, SOLUTION INTRAVENOUS; SUBCUTANEOUS at 20:00

## 2017-11-14 NOTE — HHI.PR
Subjective


Remarks


patient eating well.


Denies cp/sob.


Blood sugar better this am. Noww blood sugar in the 200's





Objective


Vitals





Vital Signs








  Date Time  Temp Pulse Resp B/P (MAP) Pulse Ox O2 Delivery O2 Flow Rate FiO2


 


11/14/17 12:00 98.1 92 18 107/64 (78) 100   


 


11/14/17 08:00 96.3 87 18 105/58 (74) 98   


 


11/14/17 00:00 97.4 86 17 111/69 (83) 100   


 


11/13/17 20:00 96.8 95 19 111/74 (86) 100   


 


11/13/17 20:00 98.7 95 24 119/77 (91) 100   


 


11/13/17 20:00  95      


 


11/13/17 18:00  93      


 


11/13/17 16:00  86      


 


11/13/17 16:00 98.5 86 32 114/75 (88) 100   














I/O      


 


 11/13/17 11/13/17 11/13/17 11/14/17 11/14/17 11/14/17





 07:00 15:00 23:00 07:00 15:00 23:00


 


Intake Total 350 ml  1090 ml   


 


Output Total   1750 ml   


 


Balance 350 ml  -660 ml   


 


      


 


Intake Oral 350 ml  1090 ml   


 


Output Urine Total   1750 ml   


 


# Voids 2  1 2  


 


# Bowel Movements 0  2   








Result Diagram:  


11/12/17 1450                                                                  

              11/13/17 1513





Objective Remarks


AAOx3


PERRLA


Clear lungs BL


Abdomen soft, nt, nd


no edema in lower extremities


Medications and IVs





Current Medications








 Medications


  (Trade)  Dose


 Ordered  Sig/Ian


 Route  Start Time


 Stop Time Status Last Admin


 


  (NS Flush)  2 ml  UNSCH  PRN


 IVF  11/12/17 14:45


     


 


 


  (Duoneb Neb)  1 ampule  Q2HR NEB  PRN


 INH  11/12/17 16:15


     


 


 


 Miscellaneous


 Information  1  Q361D


 XX  11/12/17 16:15


     


 


 


  (Chlorhexidine


 2% Cloth)  3 pack


 Taper  DAILY@04


 TOP  11/13/17 04:00


 11/9/18 03:59  11/13/17 03:30


 


 


  (Chlorhexidine


 2% Cloth)  3 pack  UNSCH  PRN


 TOP  11/12/17 16:15


     


 


 


  (Cass-Colace)  1 tab  BID


 PO  11/12/17 21:00


    11/14/17 08:50


 


 


  (Milk Of


 Magnesia Liq)  30 ml  Q12H  PRN


 PO  11/12/17 16:15


     


 


 


  (Senokot)  17.2 mg  Q12H  PRN


 PO  11/12/17 16:15


     


 


 


  (Dulcolax Supp)  10 mg  DAILY  PRN


 RECTAL  11/12/17 16:15


     


 


 


  (Lactulose Liq)  30 ml  DAILY  PRN


 PO  11/12/17 16:15


     


 


 


  (NovoLOG


 SUPPLEMENTAL


 SCALE)  1  Q4HR


 SQ  11/13/17 00:00


    11/14/17 12:03


 


 


  (D50w (Vial) Inj)  25 ml  UNSCH  PRN


 IV  11/12/17 22:45


     


 


 


  (Glucagon Inj)  1 mg  UNSCH  PRN


 IM/SQ  11/12/17 22:45


     


 


 


  (Levemir Inj)  10 units  Q12H


 SQ  11/13/17 09:00


    11/14/17 08:48


 


 


  (NovoLOG INJ)  7 units  TIDAC


 SQ  11/13/17 17:00


    11/14/17 12:03


 











A/P


Problem List:  


(1) Diabetic hyperosmolar non-ketotic state


ICD Code:  E11.00 - Type 2 diabetes mellitus with hyperosmolarity without 

nonketotic hyperglycemic-hyperosmolar coma ([facility])


Status:  Acute


(2) DKA (diabetic ketoacidoses)


ICD Code:  E13.10 - Diabetic ketoacidosis


Status:  Acute


(3) SHINE (acute kidney injury)


ICD Code:  N17.9 - Acute kidney failure, unspecified


(4) Volume depletion


ICD Code:  E86.9 - Volume depletion


Status:  Acute


(5) Diabetes type I


ICD Code:  E10.9 - Type 1 diabetes mellitus without complications


Status:  Acute


(6) Pseudohypnatremia


Assessment and Plan


NEURO: 


- Patient awake and alert.





RESP: 


- Nasal cannula oxygen if needed





CV: 


Dehydration 


Mild tachycardia 


- s/p Normal saline IV fluids 4l bolus and continue IV NS 75 ml per hour


- 11/14 much improved. 





GI: 


- Start ADA diet


- Palced on IV Famotidine which has been discontinued. 





: 


Acute kidney insufficiency 


Lactic acidosis


- Monitor renal function closely.  Continue aggressive hydration as above


- 11/14 Lactic acid 5.1 on admission.  Treated with IV fluids.  Lactic acidosis 

now resolved.





ID: 


- No indication for antibiotics at this time, check UA


11/14 UA negative.





HEME: 


- Monitor CBC, CMP





ENDO: 


DKA


- Patient presented with a blood sugar of 925, lactic acid 5.1, elevated beta 

hydroxybutyrate of 2.37 and an anion gap of 18.


The patient was treated with IV fluids and IV insulin per DKA protocol.  I will 

increase insulin Levemir to 15 units subcutaneously every 2 hours and continue 

prandial insulin with insulin NovoLog 7 units subcutaneous after meals.  

Continue SSI with insulin NovoLog and adjust insulin for a target glucose of 

less than 180.  Consult diabetes educator.  Continue with diabetic diet.  DKA 

has now resolved with a close to 9, improved beta hydroxybutyrate of 0.19 down 

from 2.37.





PROPH: 


- Bilateral lower extremity SCDs. DC Lovenox, Famotidine





ELECTROLYTES


- Pseudohyponatremia with a sodium of 118 likely secondary to severe 

hyperglycemia.  Now much improved.  Continue to monitor sodium.





LINES: 


- Utilize peripheral IVs, central line if needed





Problem Qualifiers





(1) Diabetes type I:  


Qualified Codes:  E10.8 - Type 1 diabetes mellitus with unspecified 

complications








Jean Carlos Gold MD Nov 14, 2017 15:04

## 2017-11-15 VITALS
SYSTOLIC BLOOD PRESSURE: 100 MMHG | DIASTOLIC BLOOD PRESSURE: 64 MMHG | HEART RATE: 81 BPM | TEMPERATURE: 96.5 F | OXYGEN SATURATION: 100 % | RESPIRATION RATE: 16 BRPM

## 2017-11-15 VITALS
TEMPERATURE: 96.3 F | SYSTOLIC BLOOD PRESSURE: 95 MMHG | OXYGEN SATURATION: 100 % | DIASTOLIC BLOOD PRESSURE: 60 MMHG | RESPIRATION RATE: 17 BRPM | HEART RATE: 88 BPM

## 2017-11-15 VITALS
RESPIRATION RATE: 15 BRPM | TEMPERATURE: 97.3 F | HEART RATE: 92 BPM | OXYGEN SATURATION: 100 % | DIASTOLIC BLOOD PRESSURE: 52 MMHG | SYSTOLIC BLOOD PRESSURE: 95 MMHG

## 2017-11-15 RX ADMIN — INSULIN ASPART SCH UNITS: 100 INJECTION, SOLUTION INTRAVENOUS; SUBCUTANEOUS at 12:19

## 2017-11-15 RX ADMIN — INSULIN ASPART SCH: 100 INJECTION, SOLUTION INTRAVENOUS; SUBCUTANEOUS at 12:19

## 2017-11-15 RX ADMIN — INSULIN ASPART SCH UNITS: 100 INJECTION, SOLUTION INTRAVENOUS; SUBCUTANEOUS at 09:07

## 2017-11-15 RX ADMIN — STANDARDIZED SENNA CONCENTRATE AND DOCUSATE SODIUM SCH TAB: 8.6; 5 TABLET, FILM COATED ORAL at 09:06

## 2017-11-15 RX ADMIN — INSULIN ASPART SCH: 100 INJECTION, SOLUTION INTRAVENOUS; SUBCUTANEOUS at 04:00

## 2017-11-15 RX ADMIN — INSULIN ASPART SCH: 100 INJECTION, SOLUTION INTRAVENOUS; SUBCUTANEOUS at 00:00

## 2017-11-15 RX ADMIN — ACYCLOVIR SCH UNITS: 800 TABLET ORAL at 09:06

## 2017-11-15 RX ADMIN — CHLORHEXIDINE GLUCONATE SCH PACK: 500 CLOTH TOPICAL at 04:00

## 2017-11-15 RX ADMIN — INSULIN ASPART SCH: 100 INJECTION, SOLUTION INTRAVENOUS; SUBCUTANEOUS at 09:07

## 2017-11-15 NOTE — HHI.DCPOC
Discharge Care Plan


Diagnosis:  


(1) Diabetes type I


(2) Pseudohypnatremia


(3) SHINE (acute kidney injury)


(4) DKA (diabetic ketoacidoses)


(5) Volume depletion


Goals to Promote Your Health


* To prevent worsening of your condition and complications


* To maintain your health at the optimal level


Directions to Meet Your Goals


*** Take your medications as prescribed


*** Follow your dietary instruction


*** Follow activity as directed








*** Keep your appointments as scheduled


*** Take your immunizations and boosters as scheduled


*** If your symptoms worsen call your PCP, if no PCP go to Urgent Care Center 

or Emergency Room***


*** Smoking is Dangerous to Your Health. Avoid second hand smoke***


***Call the 24-hour hour crisis hotline for domestic abuse at [phone]***











Jean Carlos Gold MD Nov 15, 2017 15:26

## 2018-05-29 ENCOUNTER — HOSPITAL ENCOUNTER (EMERGENCY)
Dept: HOSPITAL 17 - NEPC | Age: 33
Discharge: HOME | End: 2018-05-29
Payer: MEDICAID

## 2018-05-29 VITALS
RESPIRATION RATE: 18 BRPM | SYSTOLIC BLOOD PRESSURE: 115 MMHG | HEART RATE: 76 BPM | DIASTOLIC BLOOD PRESSURE: 83 MMHG | OXYGEN SATURATION: 100 %

## 2018-05-29 VITALS
DIASTOLIC BLOOD PRESSURE: 69 MMHG | SYSTOLIC BLOOD PRESSURE: 94 MMHG | RESPIRATION RATE: 22 BRPM | HEART RATE: 164 BPM | OXYGEN SATURATION: 100 % | TEMPERATURE: 97.6 F

## 2018-05-29 VITALS
OXYGEN SATURATION: 100 % | RESPIRATION RATE: 20 BRPM | HEART RATE: 68 BPM | SYSTOLIC BLOOD PRESSURE: 117 MMHG | DIASTOLIC BLOOD PRESSURE: 79 MMHG

## 2018-05-29 VITALS — BODY MASS INDEX: 16.53 KG/M2 | HEIGHT: 65 IN | WEIGHT: 99.21 LBS

## 2018-05-29 VITALS
HEART RATE: 76 BPM | RESPIRATION RATE: 18 BRPM | DIASTOLIC BLOOD PRESSURE: 81 MMHG | OXYGEN SATURATION: 100 % | SYSTOLIC BLOOD PRESSURE: 110 MMHG

## 2018-05-29 DIAGNOSIS — E10.65: ICD-10-CM

## 2018-05-29 DIAGNOSIS — R82.71: ICD-10-CM

## 2018-05-29 DIAGNOSIS — I47.1: Primary | ICD-10-CM

## 2018-05-29 DIAGNOSIS — Z79.4: ICD-10-CM

## 2018-05-29 DIAGNOSIS — R06.02: ICD-10-CM

## 2018-05-29 LAB
ALBUMIN SERPL-MCNC: 3.9 GM/DL (ref 3.4–5)
ALP SERPL-CCNC: 138 U/L (ref 45–117)
ALT SERPL-CCNC: 23 U/L (ref 10–53)
AST SERPL-CCNC: 18 U/L (ref 15–37)
BACTERIA #/AREA URNS HPF: (no result) /HPF
BASOPHILS # BLD AUTO: 0.1 TH/MM3 (ref 0–0.2)
BASOPHILS NFR BLD: 1 % (ref 0–2)
BILIRUB SERPL-MCNC: 0.3 MG/DL (ref 0.2–1)
BUN SERPL-MCNC: 12 MG/DL (ref 7–18)
CALCIUM SERPL-MCNC: 9.7 MG/DL (ref 8.5–10.1)
CHLORIDE SERPL-SCNC: 96 MEQ/L (ref 98–107)
COLOR UR: (no result)
CREAT SERPL-MCNC: 0.87 MG/DL (ref 0.5–1)
EOSINOPHIL # BLD: 0 TH/MM3 (ref 0–0.4)
EOSINOPHIL NFR BLD: 0.6 % (ref 0–4)
ERYTHROCYTE [DISTWIDTH] IN BLOOD BY AUTOMATED COUNT: 13.3 % (ref 11.6–17.2)
GFR SERPLBLD BASED ON 1.73 SQ M-ARVRAT: 91 ML/MIN (ref 89–?)
GLUCOSE SERPL-MCNC: 464 MG/DL (ref 74–106)
GLUCOSE UR STRIP-MCNC: 1000 MG/DL
HCO3 BLD-SCNC: 23.2 MEQ/L (ref 21–32)
HCT VFR BLD CALC: 42.4 % (ref 35–46)
HGB BLD-MCNC: 14.2 GM/DL (ref 11.6–15.3)
HGB UR QL STRIP: (no result)
KETONES UR STRIP-MCNC: 40 MG/DL
LYMPHOCYTES # BLD AUTO: 2 TH/MM3 (ref 1–4.8)
LYMPHOCYTES NFR BLD AUTO: 27.1 % (ref 9–44)
MAGNESIUM SERPL-MCNC: 1.9 MG/DL (ref 1.5–2.5)
MCH RBC QN AUTO: 32 PG (ref 27–34)
MCHC RBC AUTO-ENTMCNC: 33.6 % (ref 32–36)
MCV RBC AUTO: 95.4 FL (ref 80–100)
MONOCYTE #: 0.4 TH/MM3 (ref 0–0.9)
MONOCYTES NFR BLD: 5.9 % (ref 0–8)
NEUTROPHILS # BLD AUTO: 4.9 TH/MM3 (ref 1.8–7.7)
NEUTROPHILS NFR BLD AUTO: 65.4 % (ref 16–70)
NITRITE UR QL STRIP: (no result)
PLATELET # BLD: 301 TH/MM3 (ref 150–450)
PMV BLD AUTO: 8.7 FL (ref 7–11)
PROT SERPL-MCNC: 8.4 GM/DL (ref 6.4–8.2)
RBC # BLD AUTO: 4.45 MIL/MM3 (ref 4–5.3)
SODIUM SERPL-SCNC: 132 MEQ/L (ref 136–145)
SP GR UR STRIP: 1.04 (ref 1–1.03)
SQUAMOUS #/AREA URNS HPF: 1 /HPF (ref 0–5)
URINE LEUKOCYTE ESTERASE: (no result)
WBC # BLD AUTO: 7.5 TH/MM3 (ref 4–11)

## 2018-05-29 PROCEDURE — 80053 COMPREHEN METABOLIC PANEL: CPT

## 2018-05-29 PROCEDURE — 81001 URINALYSIS AUTO W/SCOPE: CPT

## 2018-05-29 PROCEDURE — 96360 HYDRATION IV INFUSION INIT: CPT

## 2018-05-29 PROCEDURE — 99285 EMERGENCY DEPT VISIT HI MDM: CPT

## 2018-05-29 PROCEDURE — 96361 HYDRATE IV INFUSION ADD-ON: CPT

## 2018-05-29 PROCEDURE — 87086 URINE CULTURE/COLONY COUNT: CPT

## 2018-05-29 PROCEDURE — 71045 X-RAY EXAM CHEST 1 VIEW: CPT

## 2018-05-29 PROCEDURE — 85025 COMPLETE CBC W/AUTO DIFF WBC: CPT

## 2018-05-29 PROCEDURE — 83605 ASSAY OF LACTIC ACID: CPT

## 2018-05-29 PROCEDURE — 84703 CHORIONIC GONADOTROPIN ASSAY: CPT

## 2018-05-29 PROCEDURE — 83735 ASSAY OF MAGNESIUM: CPT

## 2018-05-29 PROCEDURE — 96372 THER/PROPH/DIAG INJ SC/IM: CPT

## 2018-05-29 PROCEDURE — 87077 CULTURE AEROBIC IDENTIFY: CPT

## 2018-05-29 PROCEDURE — 82805 BLOOD GASES W/O2 SATURATION: CPT

## 2018-05-29 PROCEDURE — 87186 SC STD MICRODIL/AGAR DIL: CPT

## 2018-05-29 PROCEDURE — 82010 KETONE BODYS QUAN: CPT

## 2018-05-29 PROCEDURE — 93005 ELECTROCARDIOGRAM TRACING: CPT

## 2018-05-29 NOTE — EKG
Date Performed: 05/29/2018       Time Performed: 08:40:38

 

PTAGE:      32 years

 

EKG:      Sinus rhythm 

 

 POSSIBLE RIGHT VENTRICULAR CONDUCTION DELAY BORDERLINE ECG

 

PREVIOUS TRACING       : 05/29/2018 08.34 Since prior tracing, rate has slowed with normalization of 
ST-T wave changes.

 

DOCTOR:   Quincy Coats  Interpretating Date/Time  06/01/2018 08:17:39

## 2018-05-29 NOTE — PD
HPI


Chief Complaint:  Chest Pain


Time Seen by Provider:  08:31


Travel History


International Travel<30 days:  No


Contact w/Intl Traveler<30days:  No


Traveled to known affect area:  No





History of Present Illness


HPI


Is a 32-year-old woman who presents to the emergency department chest pain.  

States symptoms started last night.  Associated shortness of breath 

palpitations.  She is a history of diabetes.  States her blood sugars been a 

little bit high recently the 200s.  No other recent illness or injury.  She 

states she has had similar chest pains and palpitations in the past, but does 

not recall ever being treated with adenosine or similar episodes.  History of 

DKA in the records.  No recent other illness or injury persisting, moderately 

severe.





History


Past Medical History


*** Narrative Medical


Type 1 diabetes


Tetanus Vaccination:  Unknown


Influenza Vaccination:  Yes


LMP:  Last month


:  5


Para:  4





Social History


Alcohol Use:  No


Tobacco Use:  No





Allergies-Medications


(Allergen,Severity, Reaction):  


Coded Allergies:  


     No Known Allergies (Verified  Allergy, Unknown, 18)


Reported Meds & Prescriptions





Reported Meds & Active Scripts


Active


Novolog Inj (Insulin Aspart) 1,000 Unit/10 Ml Vial 2-12 Units SQ ACHS


     Max dose at bedtime (8) units; sugars less than 70,(0) units; sugars


     150-199,(2) units; sugars 200-249,(4) units; sugars 250-299,(7) units;


     sugars 300-349,(10) units; sugars greater than 349,(12)units


Sharpsafety Sharps Contai (Parenteral Therapy Supplies) 1 Mis Mis Ea .ROUTE AS 

DIRECTED


Glucocom Test Strips (Blood Glucose Test Strips) 1 Belkis Belkis Ea .ROUTE AS DIRECTED


Lancets 1 Mis Mis Ea .ROUTE AS DIRECTED


Insulin Syringe/U-100/31G X 5/16" 1 ml 31 Gauge X 5/16" Mis Ea .ROUTE AS 

DIRECTED


Levemir Inj (Insulin Detemir) 1,000 unit/ 10 ML Vial 15 Units SQ Q12H


     Do not mix with any other Insulin.


Novolog Inj (Insulin Aspart) 1,000 Unit/10 Ml Vial 7 Units SQ TIDAC


     Do not apply injection if not going to eat.








Review of Systems


Except as stated in HPI:  all other systems reviewed are Neg





Physical Exam


Narrative


GENERAL: Well-appearing 32-year-old woman, uncomfortable but nontoxic.


SKIN: Focused skin assessment warm/dry.


HEAD: Atraumatic. Normocephalic. 


EYES: Pupils equal and round. No scleral icterus. No injection or drainage. 


ENT: No nasal bleeding or discharge.  Mucous membranes pink and moist.


NECK: Trachea midline. No JVD. 


CARDIOVASCULAR: Heart rate rapid.  No appreciable murmurs.


RESPIRATORY: No accessory muscle use. Clear to auscultation. Breath sounds 

equal bilaterally. 


GASTROINTESTINAL: Abdomen soft, non-tender, nondistended. Hepatic and splenic 

margins not palpable. 


MUSCULOSKELETAL: No obvious deformities. No clubbing.  No cyanosis.  No edema. 


NEUROLOGICAL: Awake and alert. No obvious cranial nerve deficits.  Motor 

grossly within normal limits. Normal speech.


PSYCHIATRIC: A little bit anxious.





Data


Data


Last Documented VS





Vital Signs








  Date Time  Temp Pulse Resp B/P (MAP) Pulse Ox O2 Delivery O2 Flow Rate FiO2


 


18 11:00  76 18 115/83 (94) 100 Room Air  


 


18 08:20 97.6       








Orders





 Orders


Electrocardiogram (18 )


Adenosine Inj (Adenocard Inj) (18 08:36)


Complete Blood Count With Diff (18 08:38)


Comprehensive Metabolic Panel (18 08:38)


Magnesium (Mg) (18 08:38)


Beta Hydroxybutyrate (Acetone) (18 08:38)


Lactic Acid (18 08:38)


Urinalysis - C+S If Indicated (18 08:38)


Chest, Single Ap (18 08:38)


Blood Gas Venous (Vbg) (18 08:38)


Blood Glucose (18 08:38)


Blood Glucose (18 09:38)


Ecg Monitoring (18 08:38)


Iv Access Insert/Monitor (18 08:38)


Oximetry (18 08:38)


NPO (18 08:38)


Sodium Chlor 0.9% 1000 Ml Inj (Ns 1000 M (18 08:38)


Sodium Chlor 0.9% 1000 Ml Inj (Ns 1000 M (18 09:08)


Sodium Chloride 0.9% Flush (Ns Flush) (18 08:45)


Ed Urine Pregnancytest Poc (18 08:38)


Urine Culture (18 08:56)


Insulin Aspart Inj (Novolog Inj) (18 10:00)


Electrocardiogram (18 08:40)





Labs





Laboratory Tests








Test


  18


08:43 18


08:56


 


Blood Gas Puncture Site RN  


 


Blood Gas Patient Temperature 98.6  


 


Venous Blood pH 7.38  


 


Venous Blood Partial Pressure


CO2 44 mmHg 


  


 


 


Venous Blood Partial Pressure


O2 7 mmHg 


  


 


 


Venous Blood HCO3 25 mmol/L  


 


Venous Blood Oxygen Saturation 5 %  


 


Venous Blood Oxygen Content 1.1 Vol %  


 


Venous Blood Base Excess 0.4 mmol/L  


 


Blood Gas Inspired Oxygen 21 %  


 


White Blood Count  7.5 TH/MM3 


 


Red Blood Count  4.45 MIL/MM3 


 


Hemoglobin  14.2 GM/DL 


 


Hematocrit  42.4 % 


 


Mean Corpuscular Volume  95.4 FL 


 


Mean Corpuscular Hemoglobin  32.0 PG 


 


Mean Corpuscular Hemoglobin


Concent 


  33.6 % 


 


 


Red Cell Distribution Width  13.3 % 


 


Platelet Count  301 TH/MM3 


 


Mean Platelet Volume  8.7 FL 


 


Neutrophils (%) (Auto)  65.4 % 


 


Lymphocytes (%) (Auto)  27.1 % 


 


Monocytes (%) (Auto)  5.9 % 


 


Eosinophils (%) (Auto)  0.6 % 


 


Basophils (%) (Auto)  1.0 % 


 


Neutrophils # (Auto)  4.9 TH/MM3 


 


Lymphocytes # (Auto)  2.0 TH/MM3 


 


Monocytes # (Auto)  0.4 TH/MM3 


 


Eosinophils # (Auto)  0.0 TH/MM3 


 


Basophils # (Auto)  0.1 TH/MM3 


 


CBC Comment  DIFF FINAL 


 


Differential Comment   


 


Urine Color  LIGHT-YELLOW 


 


Urine Turbidity  CLEAR 


 


Urine pH  6.0 


 


Urine Specific Gravity  1.038 


 


Urine Protein  NEG mg/dL 


 


Urine Glucose (UA)  1000 mg/dL 


 


Urine Ketones  40 mg/dL 


 


Urine Occult Blood  MOD 


 


Urine Nitrite  NEG 


 


Urine Bilirubin  NEG 


 


Urine Urobilinogen


  


  LESS THAN 2.0


MG/DL


 


Urine Leukocyte Esterase  SMALL 


 


Urine RBC  1 /hpf 


 


Urine WBC  14 /hpf 


 


Urine Squamous Epithelial


Cells 


  1 /hpf 


 


 


Urine Bacteria  FEW /hpf 


 


Microscopic Urinalysis Comment


  


  CULTURE


INDICATED


 


Blood Urea Nitrogen  12 MG/DL 


 


Creatinine  0.87 MG/DL 


 


Random Glucose  464 MG/DL 


 


Total Protein  8.4 GM/DL 


 


Albumin  3.9 GM/DL 


 


Calcium Level  9.7 MG/DL 


 


Magnesium Level  1.9 MG/DL 


 


Alkaline Phosphatase  138 U/L 


 


Aspartate Amino Transf


(AST/SGOT) 


  18 U/L 


 


 


Alanine Aminotransferase


(ALT/SGPT) 


  23 U/L 


 


 


Total Bilirubin  0.3 MG/DL 


 


Sodium Level  132 MEQ/L 


 


Potassium Level  4.0 MEQ/L 


 


Chloride Level  96 MEQ/L 


 


Carbon Dioxide Level  23.2 MEQ/L 


 


Anion Gap  13 MEQ/L 


 


Estimat Glomerular Filtration


Rate 


  91 ML/MIN 


 


 


Lactic Acid Level  2.7 mmol/L 


 


B-Hydroxybutyrate  1.76 MMOL/L 











[place]


Medical Decision Making


Medical Screen Exam Complete:  Yes


Emergency Medical Condition:  Yes


Interpretation(s)


My review of EKG: SVT at a rate of 163, normal axis, nonspecific lateral ST 

depressions inferior laterally.


My review of repeat EKG shows normal sinus rhythm at a rate of 83, normal axis, 

normal intervals, no definite evidence of acute ischemia.





LABS:


CBC is unremarkable.


CMP is unremarkable.


Glucose is 464


Lactate 2.7


UA with elevated specific gravity, elevated glucose, some trace pyuria.


Beta hydroxybutyrate 1.76.





Chest x-ray negative


Differential Diagnosis


SVT, DKA, hyperglycemia, infection, dehydration, other


Narrative Course


Medical decision making





Is a 32-year-old woman presents to the emergency department complaining of 

chest pain.  She found to be in SVT.  She converted with vagal maneuvers only.  

She has some hyperglycemia also.  No evidence of DKA.  Given IV fluid hydration

, labs were obtained, was given subcu insulin.  Will recheck blood sugar, 

recommend outpatient follow-up.





Diagnosis





 Primary Impression:  


 SVT (supraventricular tachycardia)


 Additional Impression:  


 Hyperglycemia





***Additional Instructions:  


Continue insulin as prescribed.





Drink plenty fluids stay well-hydrated.





Follow-up with your primary doctor in the next 1-2 days for repeat evaluation.





Return to the emergency department for any new or worsening symptoms.


Disposition:  01 DISCHARGE HOME


Condition:  Stable











Cory Guaman MD May 29, 2018 10:14

## 2018-05-29 NOTE — EKG
Date Performed: 05/29/2018       Time Performed: 08:34:51

 

PTAGE:      32 years

 

EKG:      SUPRAVENTRICULAR TACHYCARDIA MODERATE ST DEPRESSION ABNORMAL ECG

 

PREVIOUS TRACING       : 11/12/2017 15.05 Rate has increased significantly since prior tracing with a
ssociated ST-T changes, suggestive of ischemia.

 

DOCTOR:   Quincy Coats  Interpretating Date/Time  06/01/2018 08:17:29

## 2018-05-29 NOTE — RADRPT
EXAM DATE:  5/29/2018 8:52 AM EDT

AGE/SEX:        32 years / Female



INDICATIONS:  Center chest pain.



CLINICAL DATA:  This is the patient's initial encounter. Patient reports that signs and symptoms have
 been present for 1 day and indicates a pain score of 8/10. 

                                                                          

MEDICAL/SURGICAL HISTORY:       Diabetes mellitus type I. Tubal ligation.



[place]      [facility] [place]  .



FINDINGS:  

A single AP view of the chest demonstrates the lungs to be symmetrically aerated without evidence of 
mass, infiltrate or effusion.  The cardiomediastinal contours are unremarkable.  Osseous structures a
re intact.  





CONCLUSION: 

No acute cardiopulmonary disease



Electronically signed by: Shane Byrd MD  5/29/2018 8:55 AM EDT